# Patient Record
Sex: MALE | Race: WHITE | NOT HISPANIC OR LATINO | Employment: FULL TIME | URBAN - NONMETROPOLITAN AREA
[De-identification: names, ages, dates, MRNs, and addresses within clinical notes are randomized per-mention and may not be internally consistent; named-entity substitution may affect disease eponyms.]

---

## 2017-03-02 ENCOUNTER — CONSULT (OUTPATIENT)
Dept: CARDIOLOGY | Facility: CLINIC | Age: 52
End: 2017-03-02

## 2017-03-02 VITALS
HEART RATE: 70 BPM | HEIGHT: 71 IN | BODY MASS INDEX: 32.9 KG/M2 | WEIGHT: 235 LBS | DIASTOLIC BLOOD PRESSURE: 74 MMHG | SYSTOLIC BLOOD PRESSURE: 118 MMHG

## 2017-03-02 DIAGNOSIS — E88.81 METABOLIC SYNDROME: ICD-10-CM

## 2017-03-02 DIAGNOSIS — R06.02 SHORTNESS OF BREATH: ICD-10-CM

## 2017-03-02 DIAGNOSIS — R07.89 CHEST PRESSURE: Primary | ICD-10-CM

## 2017-03-02 DIAGNOSIS — R01.1 MURMUR, CARDIAC: ICD-10-CM

## 2017-03-02 DIAGNOSIS — R00.2 PALPITATIONS: ICD-10-CM

## 2017-03-02 PROCEDURE — 93000 ELECTROCARDIOGRAM COMPLETE: CPT | Performed by: INTERNAL MEDICINE

## 2017-03-02 PROCEDURE — 99244 OFF/OP CNSLTJ NEW/EST MOD 40: CPT | Performed by: INTERNAL MEDICINE

## 2017-03-02 NOTE — PROGRESS NOTES
CARDIAC COMPLAINTS  chest pressure/discomfort and fatigue      Calli Elizabeth is a 52 y.o. male came in today for his initial cardiac evaluation.  He has history of significant metabolic syndrome for which he underwent bariatric surgery in the form of gastric sleeve and has lost almost 225 pounds.  He used to be on multiple medication for blood pressure cholesterol which were all stopped.  Recently, he started noticing fatigue and found to have some hematuria.  Workup showed kidney stone with infection and hydronephrosis.  He was admitted to the hospital, had nephrostomy tube placed and later had removal of the kidney stones.  During this time, he started noticing chest tightness and sharp chest pain.  It was occurring more and a stress, lasting for a few minutes and then subsides on its own.  Since the symptoms are getting little bit more progressive for a while he is now referred for further evaluation.  Since the kidney stones have been remote, for the last one week the symptoms have come down but still persisting.    Past Surgical History   Procedure Laterality Date   • Cardiovascular stress test  05/04/2006     @[facility]. - 7 Min, 89% THR. EF 51%. negative       Current Outpatient Prescriptions   Medication Sig Dispense Refill   • meloxicam (MOBIC) 15 MG tablet Take 15 mg by mouth Daily.       No current facility-administered medications for this visit.            ALLERGIES:  Review of patient's allergies indicates no known allergies.    Past Medical History   Diagnosis Date   • Arthritis    • H/O bariatric surgery      Gastric sleeve   • Hypertension    • Kidney stone      with abcess       History   Smoking Status   • Never Smoker   Smokeless Tobacco   • Never Used        Review of Systems   Constitutional: Positive for fatigue. Negative for activity change.   HENT: Negative for congestion.    Respiratory: Positive for chest tightness and shortness of breath.    Cardiovascular: Positive  "for chest pain.   Gastrointestinal: Negative for abdominal distention.   Endocrine: Negative for cold intolerance.   Genitourinary: Negative for difficulty urinating.   Musculoskeletal: Positive for arthralgias.   Skin: Negative for color change.   Allergic/Immunologic: Negative for environmental allergies.   Neurological: Negative for dizziness.   Hematological: Negative for adenopathy.   Psychiatric/Behavioral: Negative for agitation.       Diabetes- No  Thyroid- normal    Objective     Visit Vitals   • /74 (BP Location: Left arm)   • Pulse 70   • Ht 71\" (180.3 cm)   • Wt 235 lb (107 kg)   • BMI 32.78 kg/m2       Physical Exam   Constitutional: He appears well-developed.   HENT:   Head: Normocephalic.   Eyes: Pupils are equal, round, and reactive to light.   Neck: Normal range of motion.   Cardiovascular: Normal rate.    Murmur heard.  Pulmonary/Chest: Effort normal.   Abdominal: Soft.   Musculoskeletal: Normal range of motion.   Neurological: He is alert.   Skin: Skin is warm.   Psychiatric: He has a normal mood and affect.         ECG 12 Lead  Date/Time: 3/2/2017 2:11 PM  Performed by: KRISTA HERMAN  Authorized by: KRISTA HERMAN   Previous ECG: no previous ECG available  Rhythm: sinus rhythm  Rate: normal  QRS axis: normal  Clinical impression: non-specific ECG              Assessment/Plan   His heart rate and blood pressure appears to be normal.  His EKG showed normal sinus rhythm with nonspecific ST-T changes.  He had lab work done this morning at your office.  I will really appreciate if you can send me copy of those.  I explained to him, that his symptoms could be related to stress-induced hypertension.  But he cannot rule out underlying ischemia.  He does have multiple risk factors for that.  I scheduled him to undergo a stress test to evaluate his blood pressure response, chronotropic response and also to rule out ischemia causing the chest pain.  He also need an echocardiogram to " evaluate the LV function, valvular structures and rule out pericardial effusion.  Based on the results of these test, further recommendations will be made.   Lefty was seen today for establish care, cardiac history and kidney stone.    Diagnoses and all orders for this visit:    Chest pressure  -     Stress Test With Myocardial Perfusion One Day; Future  -     Stress Test With Myocardial Perfusion One Day    Shortness of breath  -     Adult Transthoracic Echo Complete; Future  -     Adult Transthoracic Echo Complete    Murmur, cardiac  -     Adult Transthoracic Echo Complete; Future  -     Adult Transthoracic Echo Complete    Metabolic syndrome    Palpitations  -     Stress Test With Myocardial Perfusion One Day; Future  -     Stress Test With Myocardial Perfusion One Day                    Electronically signed by lEiz Barbosa MD March 2, 2017 2:05 PM

## 2017-03-07 ENCOUNTER — HOSPITAL ENCOUNTER (OUTPATIENT)
Dept: CARDIOLOGY | Facility: HOSPITAL | Age: 52
Discharge: HOME | End: 2017-03-07
Attending: INTERNAL MEDICINE

## 2017-03-07 ENCOUNTER — OUTSIDE FACILITY SERVICE (OUTPATIENT)
Dept: CARDIOLOGY | Facility: CLINIC | Age: 52
End: 2017-03-07

## 2017-03-07 LAB
MAXIMAL PREDICTED HEART RATE: 166 BPM
STRESS TARGET HR: 141 BPM

## 2017-03-07 PROCEDURE — 93306 TTE W/DOPPLER COMPLETE: CPT

## 2017-03-07 PROCEDURE — 93306 TTE W/DOPPLER COMPLETE: CPT | Performed by: INTERNAL MEDICINE

## 2017-03-07 PROCEDURE — 93018 CV STRESS TEST I&R ONLY: CPT | Performed by: INTERNAL MEDICINE

## 2017-03-07 PROCEDURE — 78452 HT MUSCLE IMAGE SPECT MULT: CPT

## 2017-03-07 PROCEDURE — 78452 HT MUSCLE IMAGE SPECT MULT: CPT | Performed by: INTERNAL MEDICINE

## 2017-03-07 PROCEDURE — 0 TECHNETIUM SESTAMIBI: Performed by: INTERNAL MEDICINE

## 2017-03-07 PROCEDURE — 93017 CV STRESS TEST TRACING ONLY: CPT

## 2017-03-07 PROCEDURE — A9500 TC99M SESTAMIBI: HCPCS | Performed by: INTERNAL MEDICINE

## 2017-03-07 RX ADMIN — Medication 1 DOSE: at 11:00

## 2017-03-09 ENCOUNTER — TELEPHONE (OUTPATIENT)
Dept: CARDIOLOGY | Facility: CLINIC | Age: 52
End: 2017-03-09

## 2017-03-24 ENCOUNTER — HOSPITAL ENCOUNTER (EMERGENCY)
Facility: HOSPITAL | Age: 52
Discharge: LEFT BEFORE DISCHARGE | End: 2017-03-24

## 2017-03-24 VITALS
DIASTOLIC BLOOD PRESSURE: 65 MMHG | BODY MASS INDEX: 32.34 KG/M2 | OXYGEN SATURATION: 98 % | WEIGHT: 231 LBS | SYSTOLIC BLOOD PRESSURE: 117 MMHG | HEART RATE: 65 BPM | HEIGHT: 71 IN | TEMPERATURE: 98 F | RESPIRATION RATE: 18 BRPM

## 2017-03-24 LAB
BACTERIA UR QL AUTO: ABNORMAL /HPF
BILIRUB UR QL STRIP: NEGATIVE
CLARITY UR: ABNORMAL
COLOR UR: YELLOW
GLUCOSE UR STRIP-MCNC: NEGATIVE MG/DL
HGB UR QL STRIP.AUTO: ABNORMAL
HYALINE CASTS UR QL AUTO: ABNORMAL /LPF
KETONES UR QL STRIP: ABNORMAL
LEUKOCYTE ESTERASE UR QL STRIP.AUTO: ABNORMAL
NITRITE UR QL STRIP: NEGATIVE
PH UR STRIP.AUTO: 5.5 [PH] (ref 5–8)
PROT UR QL STRIP: ABNORMAL
RBC # UR: ABNORMAL /HPF
REF LAB TEST METHOD: ABNORMAL
SP GR UR STRIP: >=1.03 (ref 1–1.03)
SQUAMOUS #/AREA URNS HPF: ABNORMAL /HPF
UROBILINOGEN UR QL STRIP: ABNORMAL
WBC UR QL AUTO: ABNORMAL /HPF

## 2017-03-24 PROCEDURE — 81001 URINALYSIS AUTO W/SCOPE: CPT

## 2017-03-24 PROCEDURE — 87086 URINE CULTURE/COLONY COUNT: CPT

## 2017-03-24 PROCEDURE — 87186 SC STD MICRODIL/AGAR DIL: CPT

## 2017-03-24 PROCEDURE — 87077 CULTURE AEROBIC IDENTIFY: CPT

## 2017-03-24 PROCEDURE — 99211 OFF/OP EST MAY X REQ PHY/QHP: CPT

## 2017-03-24 RX ORDER — SODIUM CHLORIDE 0.9 % (FLUSH) 0.9 %
10 SYRINGE (ML) INJECTION AS NEEDED
Status: DISCONTINUED | OUTPATIENT
Start: 2017-03-24 | End: 2017-03-24 | Stop reason: HOSPADM

## 2017-03-27 LAB — BACTERIA SPEC AEROBE CULT: ABNORMAL

## 2017-03-28 ENCOUNTER — TELEPHONE (OUTPATIENT)
Dept: EMERGENCY DEPT | Facility: HOSPITAL | Age: 52
End: 2017-03-28

## 2017-09-05 ENCOUNTER — OFFICE VISIT (OUTPATIENT)
Dept: BARIATRICS/WEIGHT MGMT | Facility: CLINIC | Age: 52
End: 2017-09-05

## 2017-09-05 VITALS
DIASTOLIC BLOOD PRESSURE: 88 MMHG | SYSTOLIC BLOOD PRESSURE: 135 MMHG | HEIGHT: 71 IN | TEMPERATURE: 97.9 F | BODY MASS INDEX: 33.6 KG/M2 | WEIGHT: 240 LBS | OXYGEN SATURATION: 99 % | HEART RATE: 74 BPM | RESPIRATION RATE: 18 BRPM

## 2017-09-05 DIAGNOSIS — Z98.84 STATUS POST BARIATRIC SURGERY: ICD-10-CM

## 2017-09-05 DIAGNOSIS — Z13.21 MALNUTRITION SCREEN: ICD-10-CM

## 2017-09-05 DIAGNOSIS — Z90.3 POSTGASTRECTOMY MALABSORPTION: Primary | ICD-10-CM

## 2017-09-05 DIAGNOSIS — E66.9 OBESITY, CLASS II, BMI 35-39.9: ICD-10-CM

## 2017-09-05 DIAGNOSIS — Z13.0 SCREENING, ANEMIA, DEFICIENCY, IRON: ICD-10-CM

## 2017-09-05 DIAGNOSIS — K91.2 POSTGASTRECTOMY MALABSORPTION: Primary | ICD-10-CM

## 2017-09-05 DIAGNOSIS — E55.9 HYPOVITAMINOSIS D: ICD-10-CM

## 2017-09-05 PROCEDURE — 99214 OFFICE O/P EST MOD 30 MIN: CPT | Performed by: SURGERY

## 2017-09-05 NOTE — PROGRESS NOTES
"[facility]ariat[facility]  [address]  [facility] [address]  [phone]        Patient Name:  Lefty Elizabeth.  :  1965      Date of Visit: 2017      Reason for Visit:   4 years postop      HPI: Lefty Elizabeth is a 52 y.o. male s/p LSG/HHR 3/31/14 by Dr. Gaytan.      Doing well.  Had a \"rough year.\"  He had kidney stones and pyelonephritis, and actually required lap-assisted left nephrectomy.  He had a PICC line for abx to treat Proteus mirabilis psoas abscess and also was drained.  All this work was done through [facility]. Has been told he can't eat as much protein as before.  Was told to cut back.  They did not specify how much exactly.  He was told to ask us.  Also concerned re: 6 pound weight gain.      Denies dysphagia, reflux, nausea, vomiting and abdominal pain.  Getting 50 or less g prot/day.  Drinking 64+ fluid oz/day.  Labs in the past revealed low iron. Was taking vitamins, but all meds discontinued about 2 months ago. Exercising: had to stop exercising while he was so ill.  Had planned skin removal on abdomen/thighs, but was delayed by nephrolithiasis and consequences.     Presurgery weight: 450 pounds.  Today's weight is 240 lb (109 kg) pounds, today's  Body mass index is 33.47 kg/(m^2)., and his weight loss since surgery is 210 pounds.      Past Medical History:   Diagnosis Date   • Arthritis    • Chronic back pain    • Dyspnea on exertion    • Fatigue    • GERD (gastroesophageal reflux disease)    • Glucose intolerance (impaired glucose tolerance)    • H/O bariatric surgery     Gastric sleeve   • Heartburn    • Hypercholesterolemia    • Hypertension    • Hypertriglyceridemia    • Insomnia    • Joint pain    • Kidney stone     with abcess   • Osteoarthritis of neck     /back   • Pain in hip    • Pain in neck     /back   • Pain, joint, ankle and foot    • Pain, knee    • Peripheral edema    • Sleep apnea    • Urinary incontinence     stress " "    Past Surgical History:   Procedure Laterality Date   • CARDIOVASCULAR STRESS TEST  05/04/2006    @[facility]. - 7 Min, 89% THR. EF 51%. negative     No outpatient prescriptions have been marked as taking for the 9/5/17 encounter (Office Visit) with Ginna Quintana MD.       No Known Allergies    Social History     Social History   • Marital status:      Spouse name: N/A   • Number of children: N/A   • Years of education: N/A     Occupational History   • Not on file.     Social History Main Topics   • Smoking status: Never Smoker   • Smokeless tobacco: Never Used   • Alcohol use No   • Drug use: No   • Sexual activity: Not on file     Other Topics Concern   • Not on file     Social History Narrative       /88 (BP Location: Left arm, Patient Position: Sitting, Cuff Size: Large Adult)  Pulse 74  Temp 97.9 °F (36.6 °C) (Temporal Artery )   Resp 18  Ht 71\" (180.3 cm)  Wt 240 lb (109 kg)  SpO2 99%  BMI 33.47 kg/m2    Physical Exam   Constitutional: He is oriented to person, place, and time. He appears well-developed and well-nourished. No distress.   HENT:   Head: Normocephalic and atraumatic.   Mouth/Throat: No oropharyngeal exudate.   Eyes: EOM are normal. Pupils are equal, round, and reactive to light.   Abdominal: Soft. He exhibits no distension and no mass. There is no tenderness. No hernia.   Recent nephrectomy scars and old lap scars well healed.  Small infraumbilical vertical midline scar well-healed   Musculoskeletal: Normal range of motion. He exhibits no edema or deformity.   Lots of loose skin on his legs   Neurological: He is alert and oriented to person, place, and time. No cranial nerve deficit.   Skin: Skin is warm and dry. He is not diaphoretic. No erythema.   Psychiatric: He has a normal mood and affect. His behavior is normal. Judgment and thought content normal.         Assessment:  4 years s/p LSG/HHR by Dr. Gaytan    ICD-10-CM ICD-9-CM   1. Postgastrectomy malabsorption " K91.2 579.3    Z90.3    2. Screening, anemia, deficiency, iron Z13.0 V78.0   3. Hypovitaminosis D E55.9 268.9   4. Status post bariatric surgery Z98.84 V45.86   5. Malnutrition screen Z13.21 V77.2   6. Obesity, Class II, BMI 35-39.9 E66.01 278.01         Plan:  Doing well. Continue w/ good food choices and healthy habits.  With respect to solitary kidney, try to get at least 75 g/day of protein.  Kidney is reportedly functioning fine, and I fear to shift balance toward protein malnutrition.  Continue routine exercise.  Routine bariatric labs ordered.  Continue vitamins w/ adjustments pending lab results.  Call w/ problems/concerns.     The patient was instructed to follow up in 1 year, sooner if needed.    note: approx 15 of the 25 minute visit was spent counseling on nutrition and necessary dietary/lifestyle modifications.    Ginna Quintana MD

## 2017-09-08 LAB
25(OH)D3+25(OH)D2 SERPL-MCNC: 32.3 NG/ML (ref 30–100)
A-TOCOPHEROL VIT E SERPL-MCNC: 11.5 MG/L (ref 5.3–17.5)
ALBUMIN SERPL-MCNC: 4.4 G/DL (ref 3.5–5.5)
ALBUMIN/GLOB SERPL: 1.8 {RATIO} (ref 1.2–2.2)
ALP SERPL-CCNC: 98 IU/L (ref 39–117)
ALT SERPL-CCNC: 9 IU/L (ref 0–44)
AST SERPL-CCNC: 16 IU/L (ref 0–40)
BASOPHILS # BLD AUTO: 0 X10E3/UL (ref 0–0.2)
BASOPHILS NFR BLD AUTO: 1 %
BILIRUB SERPL-MCNC: 0.4 MG/DL (ref 0–1.2)
BUN SERPL-MCNC: 17 MG/DL (ref 6–24)
BUN/CREAT SERPL: 13 (ref 9–20)
CALCIUM SERPL-MCNC: 9.3 MG/DL (ref 8.7–10.2)
CHLORIDE SERPL-SCNC: 103 MMOL/L (ref 96–106)
CO2 SERPL-SCNC: 25 MMOL/L (ref 18–29)
CREAT SERPL-MCNC: 1.26 MG/DL (ref 0.76–1.27)
EOSINOPHIL # BLD AUTO: 0.3 X10E3/UL (ref 0–0.4)
EOSINOPHIL NFR BLD AUTO: 7 %
ERYTHROCYTE [DISTWIDTH] IN BLOOD BY AUTOMATED COUNT: 17 % (ref 12.3–15.4)
FERRITIN SERPL-MCNC: 61 NG/ML (ref 30–400)
FOLATE SERPL-MCNC: 10 NG/ML
GLOBULIN SER CALC-MCNC: 2.4 G/DL (ref 1.5–4.5)
GLUCOSE SERPL-MCNC: 118 MG/DL (ref 65–99)
HCT VFR BLD AUTO: 38 % (ref 37.5–51)
HGB BLD-MCNC: 12.9 G/DL (ref 12.6–17.7)
IMM GRANULOCYTES # BLD: 0 X10E3/UL (ref 0–0.1)
IMM GRANULOCYTES NFR BLD: 0 %
IRON SERPL-MCNC: 37 UG/DL (ref 38–169)
LYMPHOCYTES # BLD AUTO: 1.2 X10E3/UL (ref 0.7–3.1)
LYMPHOCYTES NFR BLD AUTO: 32 %
MAGNESIUM SERPL-MCNC: 1.9 MG/DL (ref 1.6–2.3)
MCH RBC QN AUTO: 28.6 PG (ref 26.6–33)
MCHC RBC AUTO-ENTMCNC: 33.9 G/DL (ref 31.5–35.7)
MCV RBC AUTO: 84 FL (ref 79–97)
METHYLMALONATE SERPL-SCNC: 147 NMOL/L (ref 0–378)
MONOCYTES # BLD AUTO: 0.4 X10E3/UL (ref 0.1–0.9)
MONOCYTES NFR BLD AUTO: 10 %
NEUTROPHILS # BLD AUTO: 1.9 X10E3/UL (ref 1.4–7)
NEUTROPHILS NFR BLD AUTO: 50 %
PHOSPHATE SERPL-MCNC: 3.9 MG/DL (ref 2.5–4.5)
PLATELET # BLD AUTO: 222 X10E3/UL (ref 150–379)
POTASSIUM SERPL-SCNC: 5 MMOL/L (ref 3.5–5.2)
PREALB SERPL-MCNC: 38 MG/DL (ref 10–36)
PROT SERPL-MCNC: 6.8 G/DL (ref 6–8.5)
PTH-INTACT SERPL-MCNC: 44 PG/ML (ref 15–65)
RBC # BLD AUTO: 4.51 X10E6/UL (ref 4.14–5.8)
SODIUM SERPL-SCNC: 145 MMOL/L (ref 134–144)
VIT A SERPL-MCNC: 91 UG/DL (ref 24–85)
VIT B1 BLD-SCNC: 141.5 NMOL/L (ref 66.5–200)
WBC # BLD AUTO: 3.8 X10E3/UL (ref 3.4–10.8)
ZINC SERPL-MCNC: 68 UG/DL (ref 56–134)

## 2017-09-14 ENCOUNTER — OFFICE VISIT (OUTPATIENT)
Dept: CARDIOLOGY | Facility: CLINIC | Age: 52
End: 2017-09-14

## 2017-09-14 VITALS
WEIGHT: 246 LBS | HEIGHT: 71 IN | HEART RATE: 72 BPM | SYSTOLIC BLOOD PRESSURE: 120 MMHG | BODY MASS INDEX: 34.44 KG/M2 | DIASTOLIC BLOOD PRESSURE: 86 MMHG

## 2017-09-14 DIAGNOSIS — E78.49 OTHER HYPERLIPIDEMIA: ICD-10-CM

## 2017-09-14 DIAGNOSIS — E88.81 METABOLIC SYNDROME: ICD-10-CM

## 2017-09-14 DIAGNOSIS — I10 ESSENTIAL HYPERTENSION: Primary | ICD-10-CM

## 2017-09-14 DIAGNOSIS — I34.0 NON-RHEUMATIC MITRAL REGURGITATION: ICD-10-CM

## 2017-09-14 DIAGNOSIS — K21.9 GASTROESOPHAGEAL REFLUX DISEASE WITHOUT ESOPHAGITIS: ICD-10-CM

## 2017-09-14 DIAGNOSIS — Z90.5 S/P NEPHRECTOMY: ICD-10-CM

## 2017-09-14 PROBLEM — E88.810 METABOLIC SYNDROME: Status: ACTIVE | Noted: 2017-09-14

## 2017-09-14 PROBLEM — E78.5 HYPERLIPEMIA: Status: ACTIVE | Noted: 2017-09-14

## 2017-09-14 PROCEDURE — 99213 OFFICE O/P EST LOW 20 MIN: CPT | Performed by: NURSE PRACTITIONER

## 2017-09-14 RX ORDER — MULTIPLE VITAMINS W/ MINERALS TAB 9MG-400MCG
1 TAB ORAL DAILY
COMMUNITY
End: 2020-07-16 | Stop reason: ALTCHOICE

## 2017-09-14 RX ORDER — TAMSULOSIN HYDROCHLORIDE 0.4 MG/1
1 CAPSULE ORAL NIGHTLY
COMMUNITY
End: 2020-07-16 | Stop reason: ALTCHOICE

## 2017-09-14 NOTE — PROGRESS NOTES
Chief Complaint   Patient presents with   • Follow-up     cardiac management, labs per Dr. Gaytan results under lab in patient's chart.        Cardiac Complaints  none      Subjective   Lefty Elizabeth is a 52 y.o. male with significant metabolic syndrome for which he underwent bariatric surgery in the form of gastric sleeve and has lost about 225 pounds.  He was on multiple medications for blood pressure anjd cholesterol management which were all stopped after his weight loss.  In February, he started noticing fatigue and hematuria.  Workup showed kidney stone with infection and hydronephrosis.  He was admitted to the hospital, had nephrostomy tube placed and later had removal of the kidney stones.  During admission, he thenstarted noticing chest tightness and sharp chest pain and was referred for the same.  Cardiac workup was advised at consult to rule out any ischemia, pericardial effusion, and to assess cardiac structures.  Stress showed good LV function without ischemic burden.  He returns today for follow up and states that he went to  in the summer for high fever, night sweats, and abnormal labs.  He was diagnosed with a recurrent kidney infection as his kidney stone became lodged and he could not rid himself of infection.  He then had a PICC line placed and received IV antibiotics for 8 weeks.  Kidney infection of left kidney did not resolve and he had a left nephrectomy removed on August 1st.  He then received another 4 weeks of IV antibiotics after removal.  He continues to follow with Dr. Jennings and Dr. Sanchez at  in regards.  He comes today for follow up and denies any new concerns.  He says from a cardiac standpoint he feel like he has been doing well.  Labs he reports recently with Dr. Gaytan, labs to chart shows creatinine at 1.26, which he says urology has explained baseline may be about 1.3, GFR over 60.  His sodium is 145 and glucose slightly elevated at 118, and RBC of 4.5 and hemoglobin of  12.9.  No refills are needed.  He states flomax with urology and is on no other prescription medication since weight loss surgery.        Cardiac History  Past Surgical History:   Procedure Laterality Date   • CARDIOVASCULAR STRESS TEST  05/04/2006    @[facility]. - 7 Min, 89% THR. EF 51%. negative   • CARDIOVASCULAR STRESS TEST  03/17/2017    EF 57%, 4 min 16 sec, no ischemia   • ECHO - CONVERTED  03/17/2017    EF 60-65%, no AS, mild MR       Current Outpatient Prescriptions   Medication Sig Dispense Refill   • Multiple Vitamins-Minerals (MULTIVITAMIN WITH MINERALS) tablet tablet Take 1 tablet by mouth Daily.     • tamsulosin (FLOMAX) 0.4 MG capsule 24 hr capsule Take 1 capsule by mouth Every Night.       No current facility-administered medications for this visit.        Review of patient's allergies indicates no known allergies.    Past Medical History:   Diagnosis Date   • Arthritis    • Chronic back pain    • Dyspnea on exertion    • Fatigue    • GERD (gastroesophageal reflux disease)    • Glucose intolerance (impaired glucose tolerance)    • H/O bariatric surgery     Gastric sleeve   • Heartburn    • Hypercholesterolemia    • Hypertension    • Hypertriglyceridemia    • Insomnia    • Joint pain    • Kidney stone     with abcess   • Osteoarthritis of neck     /back   • Pain in hip    • Pain in neck     /back   • Pain, joint, ankle and foot    • Pain, knee    • Peripheral edema    • Sleep apnea    • Urinary incontinence     stress       Social History     Social History   • Marital status:      Spouse name: N/A   • Number of children: N/A   • Years of education: N/A     Occupational History   • Not on file.     Social History Main Topics   • Smoking status: Never Smoker   • Smokeless tobacco: Never Used   • Alcohol use No   • Drug use: No   • Sexual activity: Not on file     Other Topics Concern   • Not on file     Social History Narrative       Family History   Problem Relation Age of Onset   • Lupus  "Mother    • Hypertension Mother    • Cancer Mother    • Stroke Mother    • Heart failure Father    • Heart disease Father      valve problems due to rheumatic fever   • Hypertension Father    • Obesity Father    • Cancer Brother        Review of Systems   Constitution: Negative for weakness and malaise/fatigue.   HENT: Positive for sore throat. Negative for congestion.    Cardiovascular: Negative for chest pain, dyspnea on exertion, irregular heartbeat, near-syncope, orthopnea, palpitations and syncope.   Respiratory: Negative for cough and wheezing.    Musculoskeletal: Negative for arthritis and back pain.   Gastrointestinal: Negative for anorexia, heartburn and nausea.   Genitourinary: Positive for dysuria and hesitancy. Negative for nocturia.   Neurological: Negative for dizziness, focal weakness and light-headedness.   Psychiatric/Behavioral: Negative for altered mental status and depression.       DiabetesNo  Thyroidnormal    Objective     /86 (BP Location: Left arm)  Pulse 72  Ht 71\" (180.3 cm)  Wt 246 lb (112 kg)  BMI 34.31 kg/m2    Physical Exam   Constitutional: He is oriented to person, place, and time. He appears well-developed and well-nourished.   HENT:   Head: Normocephalic and atraumatic.   Eyes: EOM are normal. Pupils are equal, round, and reactive to light.   Neck: Normal range of motion. Neck supple.   Cardiovascular: Normal rate and regular rhythm.    Pulmonary/Chest: Effort normal and breath sounds normal.   Abdominal: Soft.   Musculoskeletal: Normal range of motion.   Neurological: He is alert and oriented to person, place, and time.   Skin: Skin is warm and dry.   Psychiatric: He has a normal mood and affect. His behavior is normal.       Procedures    Assessment/Plan     HR and BP are both stable today.  His weight is up about 11 pounds since last visit but he has been very inactive for several months due to illness and kidney removal.  He will continue to follow with [facility] doctors in " regards.  Most recent cardiac workup with stress and echo findings discussed with patient, with no ischemic burden and good LV function.  Labs recently done with Dr. Gaytan to the chart reviewed with patient.  He is not on any prescription medication for his HTN and hyperlipidemia as his weight loss has improved his labs.  Good cardiac diet and activity as tolerated advised.  Since his cardiac workup showed no ischemic burden and no significant structural concerns and patient denies cardiac concerns, we will extend visits to yearly.  He was advised to call for any problems and further recommendations will be made.        Problems Addressed this Visit        Cardiovascular and Mediastinum    HTN (hypertension) - Primary    Hyperlipemia       Digestive    Gastroesophageal reflux disease without esophagitis       Other    Metabolic syndrome    S/p nephrectomy      Other Visit Diagnoses     Non-rheumatic mitral regurgitation                  Electronically signed by KIMO Trinidad September 14, 2017 10:49 AM

## 2020-07-16 ENCOUNTER — OFFICE VISIT (OUTPATIENT)
Dept: CARDIOLOGY | Facility: CLINIC | Age: 55
End: 2020-07-16

## 2020-07-16 VITALS
SYSTOLIC BLOOD PRESSURE: 160 MMHG | HEIGHT: 71 IN | DIASTOLIC BLOOD PRESSURE: 98 MMHG | WEIGHT: 269 LBS | BODY MASS INDEX: 37.66 KG/M2 | HEART RATE: 53 BPM

## 2020-07-16 DIAGNOSIS — I10 ESSENTIAL HYPERTENSION: ICD-10-CM

## 2020-07-16 DIAGNOSIS — E66.01 SEVERE OBESITY (BMI 35.0-39.9) WITH COMORBIDITY (HCC): ICD-10-CM

## 2020-07-16 DIAGNOSIS — E78.49 OTHER HYPERLIPIDEMIA: ICD-10-CM

## 2020-07-16 DIAGNOSIS — R00.1 BRADYCARDIA, SINUS: Primary | ICD-10-CM

## 2020-07-16 PROCEDURE — 99213 OFFICE O/P EST LOW 20 MIN: CPT | Performed by: NURSE PRACTITIONER

## 2020-07-16 PROCEDURE — 93000 ELECTROCARDIOGRAM COMPLETE: CPT | Performed by: NURSE PRACTITIONER

## 2020-07-16 RX ORDER — LISINOPRIL AND HYDROCHLOROTHIAZIDE 20; 12.5 MG/1; MG/1
1 TABLET ORAL DAILY
Qty: 90 TABLET | Refills: 2 | Status: SHIPPED | OUTPATIENT
Start: 2020-07-16 | End: 2021-08-09

## 2020-07-16 RX ORDER — LISINOPRIL 10 MG/1
10 TABLET ORAL DAILY
COMMUNITY
End: 2020-07-16 | Stop reason: DRUGHIGH

## 2020-07-16 NOTE — PROGRESS NOTES
Chief Complaint   Patient presents with   • Follow-up     For cardiac management. Patient is not on aspirin. Will be having lab work with new PCP next week, has not had labs since 2017. States that he does have some chest pain in the upper left chest. States that Friday before the 4th of July his BP was elevated and went to urgent care and they added back lisinopril. States that he has been taking BP in both arms and BP are different, will take BP in both arms.    • Med Refill     PCP does medication refills. Went over medications verbally.        Cardiac Complaints  none      Subjective   Lefty Elizabeth Jr. is a 55 y.o. male with HTN, bradycardia, and significant metabolic syndrome for which he underwent bariatric surgery in the form of gastric sleeve and has lost about 225 pounds.  He was on multiple medications for blood pressure anjd cholesterol management which were all stopped after his weight loss.  In February, he started noticing fatigue and hematuria.  Workup showed kidney stone with infection and hydronephrosis.  He was admitted to the hospital, had nephrostomy tube placed and later had removal of the kidney stones.  During admission, he thenstarted noticing chest tightness and sharp chest pain and was referred for the same.  Cardiac workup was advised at consult to rule out any ischemia, pericardial effusion, and to assess cardiac structures.  Stress showed good LV function without ischemic burden.  He returns today for follow up and states that he went to  in the summer for high fever, night sweats, and abnormal labs.  He was diagnosed with a recurrent kidney infection as his kidney stone became lodged and he could not rid himself of infection.  He then had a PICC line placed and received IV antibiotics for 8 weeks.  Kidney infection of left kidney did not resolve and he had a left nephrectomy removed on August 1st.  He then received another 4 weeks of IV antibiotics after removal.  He continues  to follow with Dr. Jennings and Dr. Sanchez at  in regards.    Patient returns today for follow up and does report some pain in the left upper chest area. He states he has had soreness in the area for around one year after an injury in the past.  He does report issues with HTN and states he had to go to [facility] before the 4th of July weekend, lisinopril therapy was added back. He does state blood pressures have been different in both arms and often off about 10-15 SBP in each arm. Labs he admits remain followed by PCP and will be rechecked next week. He has not had done for about 3 years. No refills needed.             Cardiac History  Past Surgical History:   Procedure Laterality Date   • CARDIOVASCULAR STRESS TEST  05/04/2006    @[facility]. - 7 Min, 89% THR. EF 51%. negative   • CARDIOVASCULAR STRESS TEST  03/17/2017    EF 57%, 4 min 16 sec, no ischemia   • ECHO - CONVERTED  03/17/2017    EF 60-65%, no AS, mild MR       Current Outpatient Medications   Medication Sig Dispense Refill   • lisinopril-hydrochlorothiazide (PRINZIDE,ZESTORETIC) 20-12.5 MG per tablet Take 1 tablet by mouth Daily. 90 tablet 2     No current facility-administered medications for this visit.        Patient has no known allergies.    Past Medical History:   Diagnosis Date   • Arthritis    • Chronic back pain    • Dyspnea on exertion    • Fatigue    • GERD (gastroesophageal reflux disease)    • Glucose intolerance (impaired glucose tolerance)    • H/O bariatric surgery     Gastric sleeve   • Heartburn    • Hypercholesterolemia    • Hypertension    • Hypertriglyceridemia    • Insomnia    • Joint pain    • Kidney stone     with abcess   • Osteoarthritis of neck     /back   • Pain in hip    • Pain in neck     /back   • Pain, joint, ankle and foot    • Pain, knee    • Peripheral edema    • Sleep apnea    • Urinary incontinence     stress       Social History     Socioeconomic History   • Marital status:      Spouse name: Not on file   •  "Number of children: Not on file   • Years of education: Not on file   • Highest education level: Not on file   Tobacco Use   • Smoking status: Never Smoker   • Smokeless tobacco: Never Used   Substance and Sexual Activity   • Alcohol use: No   • Drug use: No       Family History   Problem Relation Age of Onset   • Lupus Mother    • Hypertension Mother    • Cancer Mother    • Stroke Mother    • Heart failure Father    • Heart disease Father         valve problems due to rheumatic fever   • Hypertension Father    • Obesity Father    • Cancer Brother        Review of Systems   Constitution: Positive for malaise/fatigue. Negative for night sweats.   Cardiovascular: Negative for chest pain, claudication, dyspnea on exertion, irregular heartbeat, leg swelling, near-syncope, orthopnea, palpitations and syncope.   Respiratory: Positive for shortness of breath. Negative for cough and wheezing.    Musculoskeletal: Positive for stiffness. Negative for back pain, joint pain and joint swelling.   Gastrointestinal: Negative for anorexia, heartburn, melena, nausea and vomiting.   Genitourinary: Negative for dysuria, hematuria, hesitancy and nocturia.   Neurological: Negative for dizziness, light-headedness and loss of balance.   Psychiatric/Behavioral: Negative for depression and memory loss. The patient is not nervous/anxious.            Objective     /98 (BP Location: Right arm)   Pulse 53   Ht 180.3 cm (70.98\")   Wt 122 kg (269 lb)   BMI 37.53 kg/m²     Physical Exam   Constitutional: He is oriented to person, place, and time. He appears well-developed and well-nourished.   HENT:   Head: Normocephalic and atraumatic.   Eyes: Pupils are equal, round, and reactive to light. EOM are normal.   Neck: Normal range of motion. Neck supple.   Cardiovascular: Regular rhythm. Bradycardia present.   Murmur heard.  Pulmonary/Chest: Effort normal and breath sounds normal.   Abdominal: Soft.   Musculoskeletal: Normal range of " motion.   Neurological: He is alert and oriented to person, place, and time.   Skin: Skin is warm and dry.   Psychiatric: He has a normal mood and affect. His behavior is normal.         ECG 12 Lead  Date/Time: 7/16/2020 10:12 AM  Performed by: Mae Napier APRN  Authorized by: Mae Napier APRN   Comparison: compared with previous ECG from 3/2/2017  Rhythm: sinus bradycardia  BPM: 53    Clinical impression: abnormal EKG  Comments: Normal QT            Assessment/Plan     HTN:  Blood pressure not at goal. Elevated today at 160/98.  Patient will be urged to d/c the current lisinopril dosing of 10mg daily and begin taking zestoretic at 20/12.5mg daily.  Patient to return next Wednesday to have repeat blood pressure check in each arm. He was strongly urged to limit his sodium intake as well.     Chest soreness:  Discussed the type of pain, he attributes to muscle pain as he said it can be reproduced with certain movements.      Sinus bradycardia:  Not on any heart rate lowering medication, remains bradycardic but has been similar to prior. Symptomatic bradycardia denied.     BMI noted at 37.53, good cardiac diet with limited caloric intake, carbs, and activity as tolerated advised.    6 month follow up recommended or sooner if needed.        Problems Addressed this Visit        Cardiovascular and Mediastinum    HTN (hypertension)    Relevant Medications    lisinopril-hydrochlorothiazide (PRINZIDE,ZESTORETIC) 20-12.5 MG per tablet    Hyperlipemia      Other Visit Diagnoses     Bradycardia, sinus    -  Primary    Relevant Orders    ECG 12 Lead    Severe obesity (BMI 35.0-39.9) with comorbidity (CMS/HCC)              Patient's Body mass index is 37.53 kg/m². BMI is above normal parameters. Recommendations include: nutrition counseling.            Electronically signed by KIMO Trinidad July 17, 2020 14:38

## 2020-07-22 ENCOUNTER — CLINICAL SUPPORT (OUTPATIENT)
Dept: CARDIOLOGY | Facility: CLINIC | Age: 55
End: 2020-07-22

## 2020-07-22 ENCOUNTER — TELEPHONE (OUTPATIENT)
Dept: CARDIOLOGY | Facility: CLINIC | Age: 55
End: 2020-07-22

## 2020-07-22 NOTE — TELEPHONE ENCOUNTER
Patient was made aware to split lisinopril-hctz and see if that keeps BP from dropping at times. Patient verbalized understanding. Patient was made aware to let us know if it does not help.

## 2020-07-22 NOTE — TELEPHONE ENCOUNTER
Patient came by for BP check after starting lisinopril-hctz 20-12.5 mg daily.     BP in left arm - 130/80  BP in right arm -  134/80  HR 72    Patient states that he takes BP reading at night and it has been running around 98/53. Patient states that he has been more tired recently. Patient is asking when would be the best time of day to take medication?

## 2020-07-22 NOTE — TELEPHONE ENCOUNTER
Tell him to try and split it up to BID dosing and see how it does.  That may help with that drop in the PM.

## 2020-07-27 ENCOUNTER — TELEPHONE (OUTPATIENT)
Dept: CARDIOLOGY | Facility: CLINIC | Age: 55
End: 2020-07-27

## 2020-07-27 RX ORDER — BUPROPION HYDROCHLORIDE 75 MG/1
75 TABLET ORAL 2 TIMES DAILY
Qty: 180 TABLET | Refills: 3 | Status: SHIPPED | OUTPATIENT
Start: 2020-07-27 | End: 2021-11-24

## 2020-07-27 NOTE — TELEPHONE ENCOUNTER
Per verbal order from Mae Napier script for wellbutrin 75 mg BID sent to [facility] in [place].

## 2021-04-15 ENCOUNTER — TELEPHONE (OUTPATIENT)
Dept: CARDIOLOGY | Facility: CLINIC | Age: 56
End: 2021-04-15

## 2021-04-15 DIAGNOSIS — I10 ESSENTIAL HYPERTENSION: Primary | ICD-10-CM

## 2021-04-15 DIAGNOSIS — R93.89 ABNORMAL CT SCAN: ICD-10-CM

## 2021-04-15 NOTE — TELEPHONE ENCOUNTER
"PCP recently ordered CT, report is in chart. Concerned due to \" marked calcifications in the wall of the aorta\". He has an appointment 5/10/21 to see you. Any suggestions prior?   "

## 2021-04-22 ENCOUNTER — TELEPHONE (OUTPATIENT)
Dept: CARDIOLOGY | Facility: CLINIC | Age: 56
End: 2021-04-22

## 2021-04-22 NOTE — TELEPHONE ENCOUNTER
Please let patient know that he does not have any AAA, which is a consequence of plaque build up in the aorta.  This looks okay.

## 2021-04-23 NOTE — TELEPHONE ENCOUNTER
Thank you.

## 2021-05-10 ENCOUNTER — OFFICE VISIT (OUTPATIENT)
Dept: CARDIOLOGY | Facility: CLINIC | Age: 56
End: 2021-05-10

## 2021-05-10 VITALS
HEART RATE: 64 BPM | HEIGHT: 71 IN | TEMPERATURE: 98.2 F | DIASTOLIC BLOOD PRESSURE: 78 MMHG | WEIGHT: 269 LBS | RESPIRATION RATE: 16 BRPM | BODY MASS INDEX: 37.66 KG/M2 | SYSTOLIC BLOOD PRESSURE: 132 MMHG

## 2021-05-10 DIAGNOSIS — I10 ESSENTIAL HYPERTENSION: Primary | ICD-10-CM

## 2021-05-10 DIAGNOSIS — E66.01 SEVERE OBESITY (BMI 35.0-39.9) WITH COMORBIDITY (HCC): ICD-10-CM

## 2021-05-10 DIAGNOSIS — E78.49 OTHER HYPERLIPIDEMIA: ICD-10-CM

## 2021-05-10 PROCEDURE — 99213 OFFICE O/P EST LOW 20 MIN: CPT | Performed by: NURSE PRACTITIONER

## 2021-05-10 RX ORDER — DULOXETIN HYDROCHLORIDE 20 MG/1
20 CAPSULE, DELAYED RELEASE ORAL DAILY
COMMUNITY
End: 2022-12-21 | Stop reason: ALTCHOICE

## 2021-05-10 NOTE — PROGRESS NOTES
Chief Complaint   Patient presents with   • Follow-up     Cardiac Management, no asa therapy, last labs done in March, results in chart, managed by PCP,  reports doing well   • Med Refill     managed by PCP   • Hypertension     monitors at home,  reports BP has been good, no complaints       Cardiac Complaints  none      Subjective   Lefty Elizabeth Jr. is a 56 y.o. male with HTN, bradycardia, and significant metabolic syndrome for which he underwent bariatric surgery in the form of gastric sleeve and has lost about 225 pounds.  He was on multiple medications for blood pressure anjd cholesterol management which were all stopped after his weight loss.  In February, he started noticing fatigue and hematuria.  Workup showed kidney stone with infection and hydronephrosis.  He was admitted to the hospital, had nephrostomy tube placed and later had removal of the kidney stones.  During admission, he thenstarted noticing chest tightness and sharp chest pain and was referred for the same.  Cardiac workup was advised at consult to rule out any ischemia, pericardial effusion, and to assess cardiac structures.  Stress showed good LV function without ischemic burden.  He returns today for follow up and states that he went to  in the summer for high fever, night sweats, and abnormal labs.  He was diagnosed with a recurrent kidney infection as his kidney stone became lodged and he could not rid himself of infection. He then had a PICC line placed and received IV antibiotics for 8 weeks.  Kidney infection of left kidney did not resolve and he had a left nephrectomy removed on August 1st.  He then received another 4 weeks of IV antibiotics after removal.  He continues to follow with Dr. Jennings and Dr. Sanchez at  in regards.  Recent US of abdomen 4/15/2021 showed normal aorta, no aneurysm noted.    He returns today for follow up and reports doing well. Cardiac concerns denied. Chest pain, SOA, palpitations, dizziness, and  syncope denied.  Labs are managed with PCP as well as refills. Labs done in March 2021 showed:  BUN 23, Creatinine 1.30, Na 142, K 4.3, AST 16, ALT 10, GFR 60, TSH 0.541, VIT B12 282, VIT D 43.2, HDL 55, , TRIG 43, PSA 1.3HH 13.2/40.9.          Cardiac History  Past Surgical History:   Procedure Laterality Date   • CARDIOVASCULAR STRESS TEST  05/04/2006    @[facility]. - 7 Min, 89% THR. EF 51%. negative   • CARDIOVASCULAR STRESS TEST  03/17/2017    EF 57%, 4 min 16 sec, no ischemia   • ECHO - CONVERTED  03/17/2017    EF 60-65%, no AS, mild MR       Current Outpatient Medications   Medication Sig Dispense Refill   • DULoxetine (CYMBALTA) 20 MG capsule Take 20 mg by mouth Daily.     • lisinopril-hydrochlorothiazide (PRINZIDE,ZESTORETIC) 20-12.5 MG per tablet Take 1 tablet by mouth Daily. 90 tablet 2   • buPROPion (WELLBUTRIN) 75 MG tablet Take 1 tablet by mouth 2 (Two) Times a Day. 180 tablet 3   • sertraline (Zoloft) 50 MG tablet Take 1 tablet by mouth Daily. 90 tablet 2     No current facility-administered medications for this visit.       Patient has no known allergies.    Past Medical History:   Diagnosis Date   • Arthritis    • Chronic back pain    • Dyspnea on exertion    • Fatigue    • GERD (gastroesophageal reflux disease)    • Glucose intolerance (impaired glucose tolerance)    • H/O bariatric surgery     Gastric sleeve   • Heartburn    • Hypercholesterolemia    • Hypertension    • Hypertriglyceridemia    • Insomnia    • Joint pain    • Kidney stone     with abcess   • Osteoarthritis of neck     /back   • Pain in hip    • Pain in neck     /back   • Pain, joint, ankle and foot    • Pain, knee    • Peripheral edema    • Sleep apnea    • Urinary incontinence     stress       Social History     Socioeconomic History   • Marital status:      Spouse name: Not on file   • Number of children: Not on file   • Years of education: Not on file   • Highest education level: Not on file   Tobacco Use   •  "Smoking status: Never Smoker   • Smokeless tobacco: Never Used   Substance and Sexual Activity   • Alcohol use: No   • Drug use: No       Family History   Problem Relation Age of Onset   • Lupus Mother    • Hypertension Mother    • Cancer Mother    • Stroke Mother    • Heart failure Father    • Heart disease Father         valve problems due to rheumatic fever   • Hypertension Father    • Obesity Father    • Cancer Brother        Review of Systems   Constitutional: Negative for malaise/fatigue and night sweats.   Cardiovascular: Negative for chest pain, claudication, dyspnea on exertion, irregular heartbeat, leg swelling, near-syncope, orthopnea and syncope.   Respiratory: Negative for cough, shortness of breath and wheezing.    Musculoskeletal: Negative for back pain, joint pain and stiffness.   Gastrointestinal: Negative for anorexia, heartburn, melena, nausea and vomiting.   Genitourinary: Negative for dysuria, hematuria, hesitancy and nocturia.   Neurological: Negative for dizziness, light-headedness and loss of balance.   Psychiatric/Behavioral: Negative for depression and memory loss. The patient is not nervous/anxious.            Objective     /78 (BP Location: Right arm, Patient Position: Sitting)   Pulse 64   Temp 98.2 °F (36.8 °C) (Temporal)   Resp 16   Ht 180.3 cm (70.98\")   Wt 122 kg (269 lb)   BMI 37.54 kg/m²     Constitutional:       Appearance: Healthy appearance. Not in distress.   Eyes:      Pupils: Pupils are equal, round, and reactive to light.   HENT:      Nose: Nose normal.   Pulmonary:      Effort: Pulmonary effort is normal.      Breath sounds: Normal breath sounds.   Cardiovascular:      PMI at left midclavicular line. Normal rate. Regular rhythm.      Murmurs: There is a systolic murmur.   Abdominal:      Palpations: Abdomen is soft.   Musculoskeletal: Normal range of motion.      Cervical back: Normal range of motion and neck supple. Skin:     General: Skin is warm and dry. "   Neurological:      Mental Status: Oriented to person, place and time.         Procedures    Assessment/Plan     HTN:  Blood pressure not at goal, no changes to current . Patient continue taking zestoretic at 20/12.5mg daily. limited sodium advised.     Hyperlipidemia:  LDL at 100, will be rechecked again soon. He was urged to limit sugar/carbs in regards.       BMI noted at 37.54, good cardiac diet with limited caloric intake, carbs, and activity as tolerated advised.     6 month follow up recommended or sooner if needed.    No refills needed.         Problems Addressed this Visit        Cardiac and Vasculature    HTN (hypertension) - Primary    Hyperlipemia      Other Visit Diagnoses     Severe obesity (BMI 35.0-39.9) with comorbidity (CMS/[facility])          Diagnoses       Codes Comments    Essential hypertension    -  Primary ICD-10-CM: I10  ICD-9-CM: 401.9     Other hyperlipidemia     ICD-10-CM: E78.49  ICD-9-CM: 272.4     Severe obesity (BMI 35.0-39.9) with comorbidity (CMS/HCC)     ICD-10-CM: E66.01  ICD-9-CM: 278.01           Patient's Body mass index is 37.54 kg/m². obesity.  Good cardiac diet with limited carbs, calories and walking regimen advised.                 Electronically signed by KIMO Trinidad May 10, 2021 11:21 EDT

## 2021-08-09 RX ORDER — LISINOPRIL AND HYDROCHLOROTHIAZIDE 20; 12.5 MG/1; MG/1
TABLET ORAL
Qty: 30 TABLET | Refills: 11 | Status: SHIPPED | OUTPATIENT
Start: 2021-08-09 | End: 2021-09-20 | Stop reason: SDUPTHER

## 2021-09-20 RX ORDER — LISINOPRIL AND HYDROCHLOROTHIAZIDE 20; 12.5 MG/1; MG/1
1 TABLET ORAL DAILY
Qty: 90 TABLET | Refills: 3 | Status: SHIPPED | OUTPATIENT
Start: 2021-09-20 | End: 2021-09-23 | Stop reason: SDUPTHER

## 2021-09-20 NOTE — TELEPHONE ENCOUNTER
Received [facility] Lisinopril/HCTZ 20/12.5mg one tablet daily.

## 2021-09-23 RX ORDER — LISINOPRIL AND HYDROCHLOROTHIAZIDE 20; 12.5 MG/1; MG/1
1 TABLET ORAL DAILY
Qty: 90 TABLET | Refills: 3 | Status: SHIPPED | OUTPATIENT
Start: 2021-09-23 | End: 2021-11-24 | Stop reason: DRUGHIGH

## 2021-11-24 ENCOUNTER — OFFICE VISIT (OUTPATIENT)
Dept: CARDIOLOGY | Facility: CLINIC | Age: 56
End: 2021-11-24

## 2021-11-24 VITALS
HEART RATE: 76 BPM | HEIGHT: 70 IN | TEMPERATURE: 95.9 F | DIASTOLIC BLOOD PRESSURE: 86 MMHG | SYSTOLIC BLOOD PRESSURE: 142 MMHG | BODY MASS INDEX: 39.28 KG/M2 | WEIGHT: 274.4 LBS

## 2021-11-24 DIAGNOSIS — E66.01 SEVERE OBESITY (BMI 35.0-39.9) WITH COMORBIDITY (HCC): ICD-10-CM

## 2021-11-24 DIAGNOSIS — I10 PRIMARY HYPERTENSION: Primary | ICD-10-CM

## 2021-11-24 DIAGNOSIS — E78.49 OTHER HYPERLIPIDEMIA: ICD-10-CM

## 2021-11-24 DIAGNOSIS — E88.81 METABOLIC SYNDROME: ICD-10-CM

## 2021-11-24 DIAGNOSIS — Z90.5 S/P NEPHRECTOMY: ICD-10-CM

## 2021-11-24 PROCEDURE — 99214 OFFICE O/P EST MOD 30 MIN: CPT | Performed by: NURSE PRACTITIONER

## 2021-11-24 RX ORDER — LISINOPRIL AND HYDROCHLOROTHIAZIDE 25; 20 MG/1; MG/1
1 TABLET ORAL DAILY
Qty: 90 TABLET | Refills: 2 | Status: SHIPPED | OUTPATIENT
Start: 2021-11-24 | End: 2022-12-21 | Stop reason: ALTCHOICE

## 2021-11-24 NOTE — PROGRESS NOTES
Chief Complaint   Patient presents with   • Follow-up     Pt here for cardiac follow up.  Pt denies CP, SOB, dizziness or palpitations.  Pt does not take a daily ASA.   • Med Refill     Pt requset 90 day refills on cardiac meds.  He request refills to be sent to [facility].   • Lab Work     Pt states he had labs a few weeks ago with his PCP.       Cardiac Complaints  none      Subjective   Lefty Elizabeth Jr. is a 56 y.o. male with HTN, bradycardia, and significant metabolic syndrome for which he underwent bariatric surgery in the form of gastric sleeve and has lost about 225 pounds.  He was on multiple medications for blood pressure anjd cholesterol management which were all stopped after his weight loss.  In February, he started noticing fatigue and hematuria.  Workup showed kidney stone with infection and hydronephrosis.  He was admitted to the hospital, had nephrostomy tube placed and later had removal of the kidney stones.  During admission, he thenstarted noticing chest tightness and sharp chest pain and was referred for the same.  Cardiac workup was advised at consult to rule out any ischemia, pericardial effusion, and to assess cardiac structures.  Stress showed good LV function without ischemic burden.  He returns today for follow up and states that he went to  in the summer for high fever, night sweats, and abnormal labs.  He was diagnosed with a recurrent kidney infection as his kidney stone became lodged and he could not rid himself of infection. He then had a PICC line placed and received IV antibiotics for 8 weeks.  Kidney infection of left kidney did not resolve and he had a left nephrectomy removed on August 1st.  He then received another 4 weeks of IV antibiotics after removal.  He continues to follow with Dr. Jennings and Dr. Sanchez at  in regards.  Recent US of abdomen 4/15/2021 showed normal aorta, no aneurysm noted.    Patient comes today for follow up and denies any new concerns. Chest  pain, SOA, palpitations, dizziness, and syncope. He does not take daily ASA.  Labs done with PCP and were done few weeks ago, no copy available for review. Refills requested.           Cardiac History  Past Surgical History:   Procedure Laterality Date   • CARDIOVASCULAR STRESS TEST  05/04/2006    @[facility]. - 7 Min, 89% THR. EF 51%. negative   • CARDIOVASCULAR STRESS TEST  03/17/2017    EF 57%, 4 min 16 sec, no ischemia   • ECHO - CONVERTED  03/17/2017    EF 60-65%, no AS, mild MR       Current Outpatient Medications   Medication Sig Dispense Refill   • DULoxetine (CYMBALTA) 20 MG capsule Take 20 mg by mouth Daily.     • lisinopril-hydrochlorothiazide (PRINZIDE,ZESTORETIC) 20-25 MG per tablet Take 1 tablet by mouth Daily. 90 tablet 2     No current facility-administered medications for this visit.       Patient has no known allergies.    Past Medical History:   Diagnosis Date   • Arthritis    • Chronic back pain    • Dyspnea on exertion    • Fatigue    • GERD (gastroesophageal reflux disease)    • Glucose intolerance (impaired glucose tolerance)    • H/O bariatric surgery     Gastric sleeve   • Heartburn    • Hypercholesterolemia    • Hypertension    • Hypertriglyceridemia    • Insomnia    • Joint pain    • Kidney stone     with abcess   • Osteoarthritis of neck     /back   • Pain in hip    • Pain in neck     /back   • Pain, joint, ankle and foot    • Pain, knee    • Peripheral edema    • Sleep apnea    • Urinary incontinence     stress       Social History     Socioeconomic History   • Marital status:    Tobacco Use   • Smoking status: Never Smoker   • Smokeless tobacco: Never Used   Vaping Use   • Vaping Use: Never used   Substance and Sexual Activity   • Alcohol use: No   • Drug use: No       Family History   Problem Relation Age of Onset   • Lupus Mother    • Hypertension Mother    • Cancer Mother    • Stroke Mother    • Heart failure Father    • Heart disease Father         valve problems due to  "rheumatic fever   • Hypertension Father    • Obesity Father    • Cancer Brother        Review of Systems   Constitutional: Negative for malaise/fatigue and night sweats.   Cardiovascular: Negative for chest pain, claudication, dyspnea on exertion, irregular heartbeat, leg swelling, near-syncope, orthopnea, palpitations and syncope.   Respiratory: Negative for cough, shortness of breath and wheezing.    Musculoskeletal: Positive for stiffness. Negative for back pain and joint pain.   Gastrointestinal: Negative for anorexia, heartburn, melena, nausea and vomiting.   Genitourinary: Negative for dysuria, hematuria, hesitancy and nocturia.   Neurological: Negative for dizziness, light-headedness and loss of balance.   Psychiatric/Behavioral: Negative for depression and memory loss. The patient is not nervous/anxious.            Objective     /86 (BP Location: Left arm, Patient Position: Sitting)   Pulse 76   Temp 95.9 °F (35.5 °C)   Ht 177.8 cm (70\")   Wt 124 kg (274 lb 6.4 oz)   BMI 39.37 kg/m²     Constitutional:       Appearance: Not in distress.   Eyes:      Pupils: Pupils are equal, round, and reactive to light.   HENT:      Nose: Nose normal.   Pulmonary:      Effort: Pulmonary effort is normal.      Breath sounds: Normal breath sounds.   Cardiovascular:      PMI at left midclavicular line. Normal rate. Regular rhythm.      Murmurs: There is a systolic murmur.   Abdominal:      Palpations: Abdomen is soft.   Musculoskeletal: Normal range of motion.      Cervical back: Normal range of motion and neck supple. Skin:     General: Skin is warm and dry.   Neurological:      Mental Status: Oriented to person, place and time.         Procedures    Assessment/Plan     HTN:  Blood pressure not at goal, he admits at home is well managed. Patient continue taking zestoretic at 20/25mg daily. limited sodium advised.      Hyperlipidemia: Labs with your office, not sure what it showed, but he thinks it was normal. " Limited fried fat, fried fat, and carbs advised.    Cardiac status:  Stable. No new workup advised.  He will call with concerns.      BMI noted at 39.74, good cardiac diet with limited caloric intake, carbs, and activity as tolerated advised.     6 month follow up recommended or sooner if needed.     Refills sent per request.        Problems Addressed this Visit        Cardiac and Vasculature    HTN (hypertension) - Primary    Relevant Medications    lisinopril-hydrochlorothiazide (PRINZIDE,ZESTORETIC) 20-25 MG per tablet    Hyperlipemia       Endocrine and Metabolic    Metabolic syndrome       Genitourinary and Reproductive     S/p nephrectomy      Other Visit Diagnoses     Severe obesity (BMI 35.0-39.9) with comorbidity (HCC)          Diagnoses       Codes Comments    Primary hypertension    -  Primary ICD-10-CM: I10  ICD-9-CM: 401.9     Other hyperlipidemia     ICD-10-CM: E78.49  ICD-9-CM: 272.4     S/p nephrectomy     ICD-10-CM: Z90.5  ICD-9-CM: V45.73     Metabolic syndrome     ICD-10-CM: E88.81  ICD-9-CM: 277.7     Severe obesity (BMI 35.0-39.9) with comorbidity (HCC)     ICD-10-CM: E66.01  ICD-9-CM: 278.01           Patient's Body mass index is 39.37 kg/m². indicating that he is obese. Good cardiac diet with limited carbs, calories, and activity as tolerated advised.           Electronically signed by KIMO Trinidad November 24, 2021 11:47 EST

## 2022-05-24 ENCOUNTER — TELEPHONE (OUTPATIENT)
Dept: CARDIOLOGY | Facility: CLINIC | Age: 57
End: 2022-05-24

## 2022-05-24 NOTE — TELEPHONE ENCOUNTER
Received fax from [facility] for cardiac clearance for patient to have a left reverse total shoulder arthroplasty with latissmus dorsi transfer. According to our records, I do not see where patient is on any blood thinners or where patient has had any stenting.       Fax [phone]

## 2022-10-31 RX ORDER — LISINOPRIL AND HYDROCHLOROTHIAZIDE 20; 12.5 MG/1; MG/1
TABLET ORAL
Qty: 90 TABLET | Refills: 3 | OUTPATIENT
Start: 2022-10-31

## 2022-12-19 ENCOUNTER — TELEPHONE (OUTPATIENT)
Dept: CARDIOLOGY | Facility: CLINIC | Age: 57
End: 2022-12-19

## 2022-12-19 ENCOUNTER — CLINICAL SUPPORT (OUTPATIENT)
Dept: CARDIOLOGY | Facility: CLINIC | Age: 57
End: 2022-12-19

## 2022-12-19 DIAGNOSIS — R00.1 BRADYCARDIA, SINUS: ICD-10-CM

## 2022-12-19 DIAGNOSIS — I48.0 PAF (PAROXYSMAL ATRIAL FIBRILLATION): Primary | ICD-10-CM

## 2022-12-19 PROCEDURE — 93000 ELECTROCARDIOGRAM COMPLETE: CPT | Performed by: INTERNAL MEDICINE

## 2022-12-19 NOTE — TELEPHONE ENCOUNTER
Patient was made aware to decrease sotalol to 40 mg BID. Patient was also made aware of appointment on 12/21/22 at 10:15 am.

## 2022-12-19 NOTE — TELEPHONE ENCOUNTER
Per Dr. Barbosa per EKG, patient needs to decrease sotalol to 40 mg BID. Patient is scheduled for follow up visit on 12/21/22 at 10:15 am.

## 2022-12-19 NOTE — PROGRESS NOTES
Procedure     ECG 12 Lead    Date/Time: 12/19/2022 10:12 AM  Performed by: Eliz Barbosa MD  Authorized by: Eliz Barbosa MD   Comparison: compared with previous ECG from 7/16/2020  Similar to previous ECG  Rhythm: sinus bradycardia  Rate: bradycardic  QRS axis: normal    Clinical impression: non-specific ECG

## 2022-12-21 ENCOUNTER — OFFICE VISIT (OUTPATIENT)
Dept: CARDIOLOGY | Facility: CLINIC | Age: 57
End: 2022-12-21

## 2022-12-21 VITALS
HEART RATE: 57 BPM | BODY MASS INDEX: 40.23 KG/M2 | SYSTOLIC BLOOD PRESSURE: 128 MMHG | WEIGHT: 281 LBS | DIASTOLIC BLOOD PRESSURE: 60 MMHG | HEIGHT: 70 IN

## 2022-12-21 DIAGNOSIS — G47.30 SLEEP APNEA IN ADULT: ICD-10-CM

## 2022-12-21 DIAGNOSIS — E66.01 MORBID OBESITY WITH BMI OF 40.0-44.9, ADULT: ICD-10-CM

## 2022-12-21 DIAGNOSIS — R01.1 MURMUR, CARDIAC: ICD-10-CM

## 2022-12-21 DIAGNOSIS — R00.1 BRADYCARDIA: ICD-10-CM

## 2022-12-21 DIAGNOSIS — I48.0 PAF (PAROXYSMAL ATRIAL FIBRILLATION): Primary | ICD-10-CM

## 2022-12-21 DIAGNOSIS — E78.00 HYPERCHOLESTEREMIA: ICD-10-CM

## 2022-12-21 DIAGNOSIS — I10 PRIMARY HYPERTENSION: ICD-10-CM

## 2022-12-21 PROBLEM — E78.5 HYPERLIPEMIA: Status: RESOLVED | Noted: 2017-09-14 | Resolved: 2022-12-21

## 2022-12-21 PROCEDURE — 99214 OFFICE O/P EST MOD 30 MIN: CPT | Performed by: INTERNAL MEDICINE

## 2022-12-21 PROCEDURE — 93000 ELECTROCARDIOGRAM COMPLETE: CPT | Performed by: INTERNAL MEDICINE

## 2022-12-21 RX ORDER — ROSUVASTATIN CALCIUM 10 MG/1
10 TABLET, COATED ORAL DAILY
Qty: 90 TABLET | Refills: 3 | Status: SHIPPED | OUTPATIENT
Start: 2022-12-21 | End: 2023-03-30

## 2022-12-21 RX ORDER — SOTALOL HYDROCHLORIDE 80 MG/1
80 TABLET ORAL 2 TIMES DAILY
COMMUNITY

## 2022-12-21 NOTE — PROGRESS NOTES
Chief Complaint   Patient presents with   • Follow-up     Cardiac management. Has no cardiac complaints today.   • Atrial Fibrillation     Denies any symptoms of AFIB.    • LABS     November 2022 results on chart   • Med Refill     No refills needed today. PCP has sent prescription to pharmacy .       CARDIAC COMPLAINTS  PAF        Subjective   Leftyzehra Elizabeth Jr. is a 57 y.o. male came in today for his follow-up visit.  He has history of hypertension and hypercholesterolemia.  He also has significant metabolic syndrome.  The last time he was seen at our office was in November of last year.  He has lost about 225 pounds after bariatric surgery but then slowly started gaining weight again.  At the time he was continued on his blood pressure medication and strongly advised about dietary restriction was given.  He was seen at your office recently for routine checkup and found to be in A. fib with controlled ventricular response.  He is lab work showed normal blood count, mild renal insufficiency with a BUN/creatinine of 37 and 2.21 with GFR of 33.  His TSH was low at 0.102 but the T4 and T3 uptake was normal.  His total cholesterol is gone up to 192 with the .  He was put on sotalol and Xarelto.  Came today stating no new symptoms.  He does have some bruises.  He denies any palpitation, dizziness or loss of consciousness.              Cardiac History  Past Surgical History:   Procedure Laterality Date   • CARDIOVASCULAR STRESS TEST  05/04/2006    @[facility]. - 7 Min, 89% THR. EF 51%. negative   • CARDIOVASCULAR STRESS TEST  03/17/2017    EF 57%, 4 min 16 sec, no ischemia   • ECHO - CONVERTED  03/17/2017    EF 60-65%, no AS, mild MR       Current Outpatient Medications   Medication Sig Dispense Refill   • rivaroxaban (XARELTO) 20 MG tablet Take 20 mg by mouth Daily.     • sotalol (BETAPACE) 80 MG tablet Take 80 mg by mouth 2 (Two) Times a Day. 1/2 tablet twice daily     • DULoxetine (CYMBALTA) 20 MG capsule  Take 20 mg by mouth Daily.     • lisinopril-hydrochlorothiazide (PRINZIDE,ZESTORETIC) 20-25 MG per tablet Take 1 tablet by mouth Daily. 90 tablet 2   • rosuvastatin (CRESTOR) 10 MG tablet Take 1 tablet by mouth Daily. 90 tablet 3     No current facility-administered medications for this visit.       Allergies  :  Patient has no known allergies.       Past Medical History:   Diagnosis Date   • Arthritis    • Atrial fibrillation (HCC)    • Chronic back pain    • Dyspnea on exertion    • Fatigue    • GERD (gastroesophageal reflux disease)    • Glucose intolerance (impaired glucose tolerance)    • H/O bariatric surgery     Gastric sleeve   • Heartburn    • Hypercholesterolemia    • Hypertension    • Hypertriglyceridemia    • Insomnia    • Joint pain    • Kidney stone     with abcess   • Osteoarthritis of neck     /back   • Pain in hip    • Pain in neck     /back   • Pain, joint, ankle and foot    • Pain, knee    • Peripheral edema    • Sleep apnea    • Urinary incontinence     stress       Social History     Socioeconomic History   • Marital status:    Tobacco Use   • Smoking status: Never   • Smokeless tobacco: Never   Vaping Use   • Vaping Use: Never used   Substance and Sexual Activity   • Alcohol use: No   • Drug use: No       Family History   Problem Relation Age of Onset   • Lupus Mother    • Hypertension Mother    • Cancer Mother    • Stroke Mother    • Heart failure Father    • Heart disease Father         valve problems due to rheumatic fever   • Hypertension Father    • Obesity Father    • Cancer Brother        Review of Systems   Constitutional: Negative for decreased appetite and malaise/fatigue.   HENT: Negative for congestion and sore throat.    Eyes: Negative for blurred vision, double vision and visual disturbance.   Cardiovascular: Negative for chest pain and palpitations.   Respiratory: Negative for shortness of breath and snoring.    Endocrine: Negative for cold intolerance and heat  "intolerance.   Hematologic/Lymphatic: Negative for adenopathy. Bruises/bleeds easily.   Skin: Negative for itching, nail changes and skin cancer.   Musculoskeletal: Negative for arthritis and myalgias.   Gastrointestinal: Negative for abdominal pain, dysphagia and heartburn.   Genitourinary: Negative for bladder incontinence and frequency.   Neurological: Negative for dizziness, seizures and vertigo.   Psychiatric/Behavioral: Negative for altered mental status.   Allergic/Immunologic: Negative for environmental allergies and hives.       Diabetes- No  Thyroid- normal    Objective     /60 (BP Location: Right arm, Patient Position: Sitting)   Pulse 57   Ht 177.8 cm (70\")   Wt 127 kg (281 lb)   BMI 40.32 kg/m²     Vitals and nursing note reviewed.   Constitutional:       Appearance: Healthy appearance. Not in distress.   Eyes:      Conjunctiva/sclera: Conjunctivae normal.      Pupils: Pupils are equal, round, and reactive to light.   HENT:      Head: Normocephalic.   Pulmonary:      Effort: Pulmonary effort is normal.      Breath sounds: Normal breath sounds.   Cardiovascular:      PMI at left midclavicular line. Normal rate. Regular rhythm.      Murmurs: There is a grade 3/6 high frequency blowing holosystolic murmur at the apex.   Abdominal:      General: Bowel sounds are normal.      Palpations: Abdomen is soft.   Musculoskeletal: Normal range of motion.      Cervical back: Normal range of motion and neck supple. Skin:     General: Skin is warm and dry.   Neurological:      Mental Status: Alert, oriented to person, place, and time and oriented to person, place and time.         ECG 12 Lead    Date/Time: 12/21/2022 12:46 PM  Performed by: Eliz Barbosa MD  Authorized by: Eliz Barbosa MD   Comparison: compared with previous ECG from 12/19/2022  Similar to previous ECG  Rhythm: sinus bradycardia  Rate: bradycardic  QRS axis: right    Clinical impression: non-specific ECG                  "   @ASSESSMENT/PLAN@  Class 3 Severe Obesity (BMI >=40). Obesity-related health conditions include the following: obstructive sleep apnea, hypertension, dyslipidemias and GERD. Obesity is unchanged. BMI is is above average; BMI management plan is completed. We discussed low calorie, low carb based diet program, portion control and increasing exercise.     Diagnoses and all orders for this visit:    1. PAF (paroxysmal atrial fibrillation) (HCC) (Primary)  -     Adult Transthoracic Echo Complete W/ Cont if Necessary Per Protocol; Future    2. Primary hypertension    3. Morbid obesity with BMI of 40.0-44.9, adult ([facility])    4. Sleep apnea in adult  -     Overnight Sleep Oximetry Study; Future    5. Murmur, cardiac  -     Adult Transthoracic Echo Complete W/ Cont if Necessary Per Protocol; Future    6. Hypercholesteremia  -     rosuvastatin (CRESTOR) 10 MG tablet; Take 1 tablet by mouth Daily.  Dispense: 90 tablet; Refill: 3    At baseline he is mildly bradycardic.  His EKG shows sinus bradycardia, right axis deviation, nonspecific ST changes.  His clinical examination reveals a BMI which is gone up to 40.  His cardiovascular examination is otherwise unremarkable other than a soft systolic murmur at the mitral area    Regarding his PAF, he is maintaining sinus rhythm.  His QTC is normal.  At this time we will continue the sotalol.  If he remains in sinus rhythm then we may be able to take him off Xarelto since his CHADS2 score is only 1    Regarding hypertension, he is not on Zestoretic anymore.  He need his labs to be rechecked again to make sure the renal function is not getting any worse    Regarding obesity, I had a long talk with him again.  I gave him papers on Mediterranean diet.  I talked to him about intermittent fasting diet.    Regarding his history of sleep apnea, he used CPAP in the past which he stopped after losing weight with bariatric surgery.  Now with him having PAF need to make sure that he is not  getting sleep apnea again.  I advised him to check his nocturnal pulse PO2 measurement    Regarding the cardiac murmur, it appears to be secondary to mitral regurgitation so like to repeat echocardiogram to reevaluate his valvular structure    Regarding his history of hypercholesterolemia, since going up I started him on Crestor at 10 mg once a day.  Once he is able to get his weight down then we should be able to take him off the statin.    Overall cardiac status is stable.  I will see him back in 6 months or sooner if needed.                    Electronically signed by Eliz Barbosa MD December 21, 2022 12:31 EST

## 2022-12-28 ENCOUNTER — HOSPITAL ENCOUNTER (OUTPATIENT)
Dept: CARDIOLOGY | Facility: HOSPITAL | Age: 57
Discharge: HOME | End: 2022-12-28
Admitting: INTERNAL MEDICINE

## 2022-12-28 DIAGNOSIS — I48.0 PAF (PAROXYSMAL ATRIAL FIBRILLATION): ICD-10-CM

## 2022-12-28 DIAGNOSIS — R01.1 MURMUR, CARDIAC: ICD-10-CM

## 2022-12-28 LAB
BH CV ECHO MEAS - ACS: 2.39 CM
BH CV ECHO MEAS - AO MAX PG: 8 MMHG
BH CV ECHO MEAS - AO MEAN PG: 4.5 MMHG
BH CV ECHO MEAS - AO ROOT DIAM: 4 CM
BH CV ECHO MEAS - AO V2 MAX: 141.2 CM/SEC
BH CV ECHO MEAS - AO V2 VTI: 29.7 CM
BH CV ECHO MEAS - EDV(CUBED): 143.1 ML
BH CV ECHO MEAS - EDV(MOD-SP4): 154 ML
BH CV ECHO MEAS - EF(MOD-SP4): 56.6 %
BH CV ECHO MEAS - EF_3D-VOL: 62 %
BH CV ECHO MEAS - ESV(CUBED): 22.2 ML
BH CV ECHO MEAS - ESV(MOD-SP4): 66.9 ML
BH CV ECHO MEAS - FS: 46.3 %
BH CV ECHO MEAS - IVS/LVPW: 0.73 CM
BH CV ECHO MEAS - IVSD: 1.18 CM
BH CV ECHO MEAS - LA DIMENSION: 4.3 CM
BH CV ECHO MEAS - LAT PEAK E' VEL: 7.7 CM/SEC
BH CV ECHO MEAS - LV DIASTOLIC VOL/BSA (35-75): 63.9 CM2
BH CV ECHO MEAS - LV MASS(C)D: 310.8 GRAMS
BH CV ECHO MEAS - LV SYSTOLIC VOL/BSA (12-30): 27.7 CM2
BH CV ECHO MEAS - LVIDD: 5.2 CM
BH CV ECHO MEAS - LVIDS: 2.8 CM
BH CV ECHO MEAS - LVOT AREA: 5.5 CM2
BH CV ECHO MEAS - LVOT DIAM: 2.7 CM
BH CV ECHO MEAS - LVPWD: 1.61 CM
BH CV ECHO MEAS - MED PEAK E' VEL: 6.4 CM/SEC
BH CV ECHO MEAS - MV A MAX VEL: 55.3 CM/SEC
BH CV ECHO MEAS - MV DEC SLOPE: 144.2 CM/SEC2
BH CV ECHO MEAS - MV E MAX VEL: 51 CM/SEC
BH CV ECHO MEAS - MV E/A: 0.92
BH CV ECHO MEAS - RAP SYSTOLE: 10 MMHG
BH CV ECHO MEAS - RVDD: 2.8 CM
BH CV ECHO MEAS - RVSP: 38.6 MMHG
BH CV ECHO MEAS - SI(MOD-SP4): 36.1 ML/M2
BH CV ECHO MEAS - SV(MOD-SP4): 87.1 ML
BH CV ECHO MEAS - TR MAX PG: 28.6 MMHG
BH CV ECHO MEAS - TR MAX VEL: 267.4 CM/SEC
BH CV ECHO MEASUREMENTS AVERAGE E/E' RATIO: 7.23
LEFT ATRIUM VOLUME INDEX: 23.9 ML/M2
MAXIMAL PREDICTED HEART RATE: 161 BPM
STRESS TARGET HR: 137 BPM

## 2022-12-28 PROCEDURE — 93306 TTE W/DOPPLER COMPLETE: CPT

## 2022-12-28 PROCEDURE — 93306 TTE W/DOPPLER COMPLETE: CPT | Performed by: INTERNAL MEDICINE

## 2023-01-12 ENCOUNTER — TELEPHONE (OUTPATIENT)
Dept: CARDIOLOGY | Facility: CLINIC | Age: 58
End: 2023-01-12
Payer: COMMERCIAL

## 2023-01-12 NOTE — TELEPHONE ENCOUNTER
Patient called to d/c the xarelto and change to eliquis due to costs. Change has been made. Script sent.

## 2023-01-18 ENCOUNTER — TELEPHONE (OUTPATIENT)
Dept: CARDIOLOGY | Facility: CLINIC | Age: 58
End: 2023-01-18
Payer: COMMERCIAL

## 2023-01-18 NOTE — TELEPHONE ENCOUNTER
Reached patient to discuss about Eliquis medication not being approved through their pharmacy. They stated they wanted to start Eliquis since Xarelto was too expensive to pay at their pharmacy but has since found [facility] and is now getting Xarelto at a cheap cost and no longer wants to switch to Eliquis.

## 2023-02-14 NOTE — TELEPHONE ENCOUNTER
Patient left a  requesting a refill on Xarelto to [facility], 90 day supply.  Xarelto 20 mg daily script placed for cosign.

## 2023-03-30 ENCOUNTER — OFFICE VISIT (OUTPATIENT)
Dept: ENDOCRINOLOGY | Facility: CLINIC | Age: 58
End: 2023-03-30
Payer: COMMERCIAL

## 2023-03-30 VITALS
HEART RATE: 52 BPM | OXYGEN SATURATION: 99 % | BODY MASS INDEX: 39.8 KG/M2 | WEIGHT: 278 LBS | DIASTOLIC BLOOD PRESSURE: 64 MMHG | SYSTOLIC BLOOD PRESSURE: 124 MMHG | HEIGHT: 70 IN

## 2023-03-30 DIAGNOSIS — E05.20 TOXIC MULTINODULAR GOITER: Primary | ICD-10-CM

## 2023-03-30 PROCEDURE — 99204 OFFICE O/P NEW MOD 45 MIN: CPT | Performed by: INTERNAL MEDICINE

## 2023-03-30 RX ORDER — OMEGA-3S/DHA/EPA/FISH OIL/D3 300MG-1000
CAPSULE ORAL EVERY 24 HOURS
COMMUNITY
Start: 2022-11-15

## 2023-03-30 NOTE — ASSESSMENT & PLAN NOTE
Likely due to hot nodules in right lobe . Which would cause atrial fib.  Needs a scan and then we can decide about surgery vs I 131 therapy    Reviewed : fna, ultrasound, labs

## 2023-03-30 NOTE — PROGRESS NOTES
"     Office Note      Date: 2023  Patient Name: Lefty Elizabeth Jr.  MRN: [id number]  : 1965    Chief Complaint   Patient presents with   • thyroid nodule        History of Present Illness:   Lefty Elizabeth Jr. is a 58 y.o. male who presents for thyroid nodule   .   Patient is seen for a new patient evaluation    His thyroid story begins when he had an FNA of right thyroid nodule. It was benign. Ultrasound showes 2 large nodules in right lobe.  tsh has been low.. t4 has been normal  He struggles with weight . He has new onset atrial fib.  He notes fatigue and general sense of unwell   Subjective      P    Review of Systems:   Review of Systems   Constitutional: Positive for fatigue and unexpected weight change.   Cardiovascular: Positive for palpitations.   All other systems reviewed and are negative.      The following portions of the patient's history were reviewed and updated as appropriate: allergies, current medications, past family history, past medical history, past social history, past surgical history and problem list.    Objective     Visit Vitals  /64   Pulse 52   Ht 177.8 cm (70\")   Wt 126 kg (278 lb)   SpO2 99%   BMI 39.89 kg/m²           Physical Exam:  Physical Exam  Vitals reviewed.   Constitutional:       Appearance: Normal appearance. He is not toxic-appearing.   HENT:      Head: Normocephalic.   Eyes:      Extraocular Movements: Extraocular movements intact.   Neck:      Comments: Right thyroid fullness  Cardiovascular:      Rate and Rhythm: Bradycardia present. Rhythm irregular.      Pulses: Normal pulses.   Pulmonary:      Effort: Pulmonary effort is normal.   Musculoskeletal:         General: Normal range of motion.   Lymphadenopathy:      Cervical: No cervical adenopathy.   Skin:     General: Skin is warm.   Neurological:      General: No focal deficit present.      Mental Status: He is alert.   Psychiatric:         Mood and Affect: Mood normal.         Thought " Content: Thought content normal.         Judgment: Judgment normal.         Assessment / Plan      Assessment & Plan:  Problem List Items Addressed This Visit        Other    Toxic multinodular goiter - Primary    Current Assessment & Plan     Likely due to hot nodules in right lobe . Which would cause atrial fib.  Needs a scan and then we can decide about surgery vs I 131 therapy    Reviewed : fna, ultrasound, labs          Relevant Medications    sotalol (BETAPACE) 80 MG tablet    Other Relevant Orders    NM Thyroid Uptake & Scan        Lars Lipscomb MD   03/30/2023   Diet: DASH/TLC  Dispo: pending PT eval  DVT ppx: hep SQ    Need to do [facility] - tried to contact son who has list [phone] Jessie c/w home levothyroxine 50 mcg qd c/w home ticagrelor 60 mg BID c/w home ticagrelor 60 mg BID, ASA, statin

## 2023-04-20 ENCOUNTER — HOSPITAL ENCOUNTER (OUTPATIENT)
Dept: NUCLEAR MEDICINE | Facility: HOSPITAL | Age: 58
Discharge: HOME | End: 2023-04-20
Payer: COMMERCIAL

## 2023-04-20 DIAGNOSIS — E05.20 TOXIC MULTINODULAR GOITER: ICD-10-CM

## 2023-04-20 PROCEDURE — 78014 THYROID IMAGING W/BLOOD FLOW: CPT | Performed by: RADIOLOGY

## 2023-04-20 PROCEDURE — 0 SODIUM IODIDE 7.4 MBQ CAPSULE: Performed by: INTERNAL MEDICINE

## 2023-04-20 PROCEDURE — A9516 IODINE I-123 SOD IODIDE MIC: HCPCS | Performed by: INTERNAL MEDICINE

## 2023-04-20 PROCEDURE — 78014 THYROID IMAGING W/BLOOD FLOW: CPT

## 2023-04-20 RX ORDER — SODIUM IODIDE I 123 200 UCI/1
1 CAPSULE, GELATIN COATED ORAL
Status: COMPLETED | OUTPATIENT
Start: 2023-04-20 | End: 2023-04-20

## 2023-04-20 RX ADMIN — Medication 1 CAPSULE: at 09:15

## 2023-04-21 ENCOUNTER — HOSPITAL ENCOUNTER (OUTPATIENT)
Dept: NUCLEAR MEDICINE | Facility: HOSPITAL | Age: 58
Discharge: HOME | End: 2023-04-21
Payer: COMMERCIAL

## 2023-04-24 DIAGNOSIS — E05.20 TOXIC MULTINODULAR GOITER: Primary | ICD-10-CM

## 2023-07-27 ENCOUNTER — PRE-ADMISSION TESTING (OUTPATIENT)
Dept: PREADMISSION TESTING | Facility: HOSPITAL | Age: 58
End: 2023-07-27
Payer: COMMERCIAL

## 2023-07-27 VITALS — WEIGHT: 268.52 LBS | HEIGHT: 71 IN | BODY MASS INDEX: 37.59 KG/M2

## 2023-07-27 LAB
DEPRECATED RDW RBC AUTO: 49.1 FL (ref 37–54)
ERYTHROCYTE [DISTWIDTH] IN BLOOD BY AUTOMATED COUNT: 14.9 % (ref 12.3–15.4)
HCT VFR BLD AUTO: 41.8 % (ref 37.5–51)
HGB BLD-MCNC: 13 G/DL (ref 13–17.7)
INR PPP: 1.93 (ref 0.89–1.12)
MCH RBC QN AUTO: 28.3 PG (ref 26.6–33)
MCHC RBC AUTO-ENTMCNC: 31.1 G/DL (ref 31.5–35.7)
MCV RBC AUTO: 90.9 FL (ref 79–97)
PLATELET # BLD AUTO: 172 10*3/MM3 (ref 140–450)
PMV BLD AUTO: 10.8 FL (ref 6–12)
POTASSIUM SERPL-SCNC: 3.7 MMOL/L (ref 3.5–5.2)
PROTHROMBIN TIME: 22.2 SECONDS (ref 12.2–14.5)
RBC # BLD AUTO: 4.6 10*6/MM3 (ref 4.14–5.8)
WBC NRBC COR # BLD: 7.56 10*3/MM3 (ref 3.4–10.8)

## 2023-07-27 PROCEDURE — 85610 PROTHROMBIN TIME: CPT

## 2023-07-27 PROCEDURE — 85027 COMPLETE CBC AUTOMATED: CPT

## 2023-07-27 PROCEDURE — 84132 ASSAY OF SERUM POTASSIUM: CPT

## 2023-07-27 PROCEDURE — 36415 COLL VENOUS BLD VENIPUNCTURE: CPT

## 2023-07-27 RX ORDER — TAMSULOSIN HYDROCHLORIDE 0.4 MG/1
CAPSULE ORAL
COMMUNITY
Start: 2023-07-07

## 2023-07-27 RX ORDER — LISINOPRIL AND HYDROCHLOROTHIAZIDE 12.5; 1 MG/1; MG/1
TABLET ORAL DAILY PRN
COMMUNITY
Start: 2023-01-30

## 2023-07-27 NOTE — PAT
An arrival time for procedure was not provided during PAT visit. If patient had any questions or concerns about their arrival time, they were instructed to contact their surgeon/physician.  Additionally, if the patient referred to an arrival time that was acquired from their my chart account, patient was encouraged to verify that time with their surgeon/physician. Arrival times are NOT provided in Pre Admission Testing Department.    Patient viewed general PAT education video as instructed in their preoperative information received from their surgeon.  Patient stated the general PAT education video was viewed in its entirety and survey completed.  Copies of PAT general education handouts (Incentive Spirometry, Meds to Beds Program, Patient Belongings, Pre-op skin preparation instructions, Blood Glucose testing, Visitor policy, Surgery FAQ, Code H) distributed to patient if not printed. Education related to the PAT pass and skin preparation for surgery (if applicable) completed in PAT as a reinforcement to PAT education video. Patient instructed to return PAT pass provided today as well as completed skin preparation sheet (if applicable) on the day of procedure.     Additionally if patient had not viewed video yet but intended to view it at home or in our waiting area, then referred them to the handout with QR code/link provided during PAT visit.  Instructed patient to complete survey after viewing the video in its entirety.  Encouraged patient/family to read PAT general education handouts thoroughly and notify PAT staff with any questions or concerns. Patient verbalized understanding of all information and priority content.    Patient denies any current skin issues.     Patient to apply Chlorhexadine wipes  to surgical area (as instructed) the night before procedure and the AM of procedure. Wipes provided.    Post-Surgery Information Instruction Sheet given to patient during Pre-Admission Testing Visit with verbal  instructions to patient to return with PAT PASS on the day of surgery. Additionally, encouraged patient to review the information provided.    Verified patient previously completed cardiology visit for cardiac risk assessment in preparation for upcoming procedure, completion of 12-lead ECG within six months, and risk assessment letter reviewed. No further interventions required.   CARDIAC RISK ASSESSMENT FROM DR. HERMAN ON 7/6/23 ON CHART. PT TO HOLD XARELTO (3-4) DAYS PRIOR TO PROCEDURE. PT VERBALIZED UNDERSTANDING.     EKG FROM 7/3/23 ON CHART. PT DENIES CP/SOA.     PT STATED HE WOULD LIKE TO SPEAK TO DR. WARD ABOUT TOTAL THYROIDECTOMY VS. RIGHT THYROID LOBECTOMY. PLEASE SIGN CONSENT DOS.

## 2023-08-02 ENCOUNTER — ANESTHESIA EVENT (OUTPATIENT)
Dept: PERIOP | Facility: HOSPITAL | Age: 58
End: 2023-08-02
Payer: COMMERCIAL

## 2023-08-02 RX ORDER — SODIUM CHLORIDE 0.9 % (FLUSH) 0.9 %
10 SYRINGE (ML) INJECTION EVERY 12 HOURS SCHEDULED
Status: CANCELLED | OUTPATIENT
Start: 2023-08-02

## 2023-08-02 RX ORDER — FAMOTIDINE 10 MG/ML
20 INJECTION, SOLUTION INTRAVENOUS ONCE
Status: CANCELLED | OUTPATIENT
Start: 2023-08-02 | End: 2023-08-02

## 2023-08-02 RX ORDER — SODIUM CHLORIDE 0.9 % (FLUSH) 0.9 %
10 SYRINGE (ML) INJECTION AS NEEDED
Status: CANCELLED | OUTPATIENT
Start: 2023-08-02

## 2023-08-02 RX ORDER — SODIUM CHLORIDE 9 MG/ML
40 INJECTION, SOLUTION INTRAVENOUS AS NEEDED
Status: CANCELLED | OUTPATIENT
Start: 2023-08-02

## 2023-08-03 ENCOUNTER — HOSPITAL ENCOUNTER (OUTPATIENT)
Facility: HOSPITAL | Age: 58
Discharge: HOME | End: 2023-08-04
Attending: SURGERY | Admitting: SURGERY
Payer: COMMERCIAL

## 2023-08-03 ENCOUNTER — ANESTHESIA (OUTPATIENT)
Dept: PERIOP | Facility: HOSPITAL | Age: 58
End: 2023-08-03
Payer: COMMERCIAL

## 2023-08-03 DIAGNOSIS — E04.1 THYROID NODULE: ICD-10-CM

## 2023-08-03 DIAGNOSIS — E05.10 TOXIC THYROID NODULE: Primary | ICD-10-CM

## 2023-08-03 LAB
GLUCOSE BLDC GLUCOMTR-MCNC: 76 MG/DL (ref 70–130)
INR PPP: 1.02 (ref 0.89–1.12)
PROTHROMBIN TIME: 13.5 SECONDS (ref 12.2–14.5)

## 2023-08-03 PROCEDURE — 25010000002 PROPOFOL 10 MG/ML EMULSION: Performed by: ANESTHESIOLOGY

## 2023-08-03 PROCEDURE — 25010000002 CEFAZOLIN IN DEXTROSE 2-4 GM/100ML-% SOLUTION: Performed by: ANESTHESIOLOGY

## 2023-08-03 PROCEDURE — 85610 PROTHROMBIN TIME: CPT | Performed by: ANESTHESIOLOGY

## 2023-08-03 PROCEDURE — 25010000002 FENTANYL CITRATE (PF) 100 MCG/2ML SOLUTION: Performed by: ANESTHESIOLOGY

## 2023-08-03 PROCEDURE — 25010000002 HYDRALAZINE PER 20 MG

## 2023-08-03 PROCEDURE — 25010000002 HYDROMORPHONE PER 4 MG: Performed by: SURGERY

## 2023-08-03 PROCEDURE — 25010000002 ONDANSETRON PER 1 MG: Performed by: ANESTHESIOLOGY

## 2023-08-03 PROCEDURE — 25010000002 FENTANYL CITRATE (PF) 50 MCG/ML SOLUTION

## 2023-08-03 PROCEDURE — 82948 REAGENT STRIP/BLOOD GLUCOSE: CPT

## 2023-08-03 PROCEDURE — 25010000002 DEXAMETHASONE PER 1 MG: Performed by: ANESTHESIOLOGY

## 2023-08-03 PROCEDURE — 88307 TISSUE EXAM BY PATHOLOGIST: CPT | Performed by: SURGERY

## 2023-08-03 DEVICE — HORIZON TI SMALL 6 CLIPS/CART
Type: IMPLANTABLE DEVICE | Site: THYROID | Status: FUNCTIONAL
Brand: WECK

## 2023-08-03 DEVICE — ABSORBABLE HEMOSTAT (OXIDIZED REGENERATED CELLULOSE, U.S.P.)
Type: IMPLANTABLE DEVICE | Site: THYROID | Status: FUNCTIONAL
Brand: SURGICEL

## 2023-08-03 DEVICE — HORIZON TI MED 6/CART
Type: IMPLANTABLE DEVICE | Site: THYROID | Status: FUNCTIONAL
Brand: WECK

## 2023-08-03 RX ORDER — TAMSULOSIN HYDROCHLORIDE 0.4 MG/1
0.4 CAPSULE ORAL DAILY
Status: DISCONTINUED | OUTPATIENT
Start: 2023-08-04 | End: 2023-08-04 | Stop reason: HOSPADM

## 2023-08-03 RX ORDER — FENTANYL CITRATE 50 UG/ML
50 INJECTION, SOLUTION INTRAMUSCULAR; INTRAVENOUS
Status: DISCONTINUED | OUTPATIENT
Start: 2023-08-03 | End: 2023-08-03 | Stop reason: HOSPADM

## 2023-08-03 RX ORDER — FAMOTIDINE 20 MG/1
20 TABLET, FILM COATED ORAL ONCE
Status: DISCONTINUED | OUTPATIENT
Start: 2023-08-03 | End: 2023-08-03 | Stop reason: HOSPADM

## 2023-08-03 RX ORDER — LEVOTHYROXINE SODIUM 0.05 MG/1
50 TABLET ORAL
Status: DISCONTINUED | OUTPATIENT
Start: 2023-08-04 | End: 2023-08-04 | Stop reason: HOSPADM

## 2023-08-03 RX ORDER — ONDANSETRON 2 MG/ML
4 INJECTION INTRAMUSCULAR; INTRAVENOUS ONCE AS NEEDED
Status: DISCONTINUED | OUTPATIENT
Start: 2023-08-03 | End: 2023-08-03 | Stop reason: HOSPADM

## 2023-08-03 RX ORDER — NALOXONE HCL 0.4 MG/ML
0.1 VIAL (ML) INJECTION
Status: DISCONTINUED | OUTPATIENT
Start: 2023-08-03 | End: 2023-08-04 | Stop reason: HOSPADM

## 2023-08-03 RX ORDER — SODIUM CHLORIDE 9 MG/ML
40 INJECTION, SOLUTION INTRAVENOUS AS NEEDED
Status: DISCONTINUED | OUTPATIENT
Start: 2023-08-03 | End: 2023-08-03 | Stop reason: HOSPADM

## 2023-08-03 RX ORDER — PROPOFOL 10 MG/ML
VIAL (ML) INTRAVENOUS AS NEEDED
Status: DISCONTINUED | OUTPATIENT
Start: 2023-08-03 | End: 2023-08-03 | Stop reason: SURG

## 2023-08-03 RX ORDER — ONDANSETRON 2 MG/ML
4 INJECTION INTRAMUSCULAR; INTRAVENOUS EVERY 6 HOURS PRN
Status: DISCONTINUED | OUTPATIENT
Start: 2023-08-03 | End: 2023-08-04 | Stop reason: HOSPADM

## 2023-08-03 RX ORDER — ONDANSETRON 2 MG/ML
INJECTION INTRAMUSCULAR; INTRAVENOUS AS NEEDED
Status: DISCONTINUED | OUTPATIENT
Start: 2023-08-03 | End: 2023-08-03 | Stop reason: SURG

## 2023-08-03 RX ORDER — GLYCOPYRROLATE 0.2 MG/ML
INJECTION INTRAMUSCULAR; INTRAVENOUS AS NEEDED
Status: DISCONTINUED | OUTPATIENT
Start: 2023-08-03 | End: 2023-08-03 | Stop reason: SURG

## 2023-08-03 RX ORDER — PROMETHAZINE HYDROCHLORIDE 25 MG/1
25 TABLET ORAL ONCE AS NEEDED
Status: DISCONTINUED | OUTPATIENT
Start: 2023-08-03 | End: 2023-08-03 | Stop reason: HOSPADM

## 2023-08-03 RX ORDER — EPHEDRINE SULFATE 50 MG/ML
INJECTION INTRAVENOUS AS NEEDED
Status: DISCONTINUED | OUTPATIENT
Start: 2023-08-03 | End: 2023-08-03 | Stop reason: SURG

## 2023-08-03 RX ORDER — SOTALOL HYDROCHLORIDE 80 MG/1
40 TABLET ORAL 2 TIMES DAILY
Status: DISCONTINUED | OUTPATIENT
Start: 2023-08-03 | End: 2023-08-04 | Stop reason: HOSPADM

## 2023-08-03 RX ORDER — LIDOCAINE HYDROCHLORIDE 10 MG/ML
INJECTION, SOLUTION EPIDURAL; INFILTRATION; INTRACAUDAL; PERINEURAL AS NEEDED
Status: DISCONTINUED | OUTPATIENT
Start: 2023-08-03 | End: 2023-08-03 | Stop reason: SURG

## 2023-08-03 RX ORDER — LIDOCAINE HYDROCHLORIDE 10 MG/ML
0.5 INJECTION, SOLUTION EPIDURAL; INFILTRATION; INTRACAUDAL; PERINEURAL ONCE AS NEEDED
Status: DISCONTINUED | OUTPATIENT
Start: 2023-08-03 | End: 2023-08-03 | Stop reason: HOSPADM

## 2023-08-03 RX ORDER — SODIUM CHLORIDE, SODIUM LACTATE, POTASSIUM CHLORIDE, CALCIUM CHLORIDE 600; 310; 30; 20 MG/100ML; MG/100ML; MG/100ML; MG/100ML
INJECTION, SOLUTION INTRAVENOUS CONTINUOUS PRN
Status: DISCONTINUED | OUTPATIENT
Start: 2023-08-03 | End: 2023-08-03 | Stop reason: SURG

## 2023-08-03 RX ORDER — DOCUSATE SODIUM 100 MG/1
100 CAPSULE, LIQUID FILLED ORAL 2 TIMES DAILY PRN
Status: DISCONTINUED | OUTPATIENT
Start: 2023-08-03 | End: 2023-08-04 | Stop reason: HOSPADM

## 2023-08-03 RX ORDER — SODIUM CHLORIDE 0.9 % (FLUSH) 0.9 %
3-10 SYRINGE (ML) INJECTION AS NEEDED
Status: DISCONTINUED | OUTPATIENT
Start: 2023-08-03 | End: 2023-08-03 | Stop reason: HOSPADM

## 2023-08-03 RX ORDER — LABETALOL HYDROCHLORIDE 5 MG/ML
5 INJECTION, SOLUTION INTRAVENOUS
Status: DISCONTINUED | OUTPATIENT
Start: 2023-08-03 | End: 2023-08-03 | Stop reason: HOSPADM

## 2023-08-03 RX ORDER — DROPERIDOL 2.5 MG/ML
0.62 INJECTION, SOLUTION INTRAMUSCULAR; INTRAVENOUS
Status: DISCONTINUED | OUTPATIENT
Start: 2023-08-03 | End: 2023-08-03 | Stop reason: HOSPADM

## 2023-08-03 RX ORDER — ONDANSETRON 4 MG/1
4 TABLET, FILM COATED ORAL EVERY 6 HOURS PRN
Status: DISCONTINUED | OUTPATIENT
Start: 2023-08-03 | End: 2023-08-04 | Stop reason: HOSPADM

## 2023-08-03 RX ORDER — HYDROMORPHONE HYDROCHLORIDE 1 MG/ML
0.5 INJECTION, SOLUTION INTRAMUSCULAR; INTRAVENOUS; SUBCUTANEOUS
Status: DISCONTINUED | OUTPATIENT
Start: 2023-08-03 | End: 2023-08-04 | Stop reason: HOSPADM

## 2023-08-03 RX ORDER — CEFAZOLIN SODIUM 2 G/100ML
INJECTION, SOLUTION INTRAVENOUS AS NEEDED
Status: DISCONTINUED | OUTPATIENT
Start: 2023-08-03 | End: 2023-08-03 | Stop reason: SURG

## 2023-08-03 RX ORDER — MIDAZOLAM HYDROCHLORIDE 1 MG/ML
1 INJECTION INTRAMUSCULAR; INTRAVENOUS
Status: DISCONTINUED | OUTPATIENT
Start: 2023-08-03 | End: 2023-08-03 | Stop reason: HOSPADM

## 2023-08-03 RX ORDER — FENTANYL CITRATE 50 UG/ML
INJECTION, SOLUTION INTRAMUSCULAR; INTRAVENOUS AS NEEDED
Status: DISCONTINUED | OUTPATIENT
Start: 2023-08-03 | End: 2023-08-03 | Stop reason: SURG

## 2023-08-03 RX ORDER — MEPERIDINE HYDROCHLORIDE 25 MG/ML
12.5 INJECTION INTRAMUSCULAR; INTRAVENOUS; SUBCUTANEOUS
Status: DISCONTINUED | OUTPATIENT
Start: 2023-08-03 | End: 2023-08-03 | Stop reason: HOSPADM

## 2023-08-03 RX ORDER — SUCCINYLCHOLINE/SOD CL,ISO/PF 200MG/10ML
SYRINGE (ML) INTRAVENOUS AS NEEDED
Status: DISCONTINUED | OUTPATIENT
Start: 2023-08-03 | End: 2023-08-03 | Stop reason: SURG

## 2023-08-03 RX ORDER — ROCURONIUM BROMIDE 10 MG/ML
INJECTION, SOLUTION INTRAVENOUS AS NEEDED
Status: DISCONTINUED | OUTPATIENT
Start: 2023-08-03 | End: 2023-08-03 | Stop reason: SURG

## 2023-08-03 RX ORDER — DEXAMETHASONE SODIUM PHOSPHATE 4 MG/ML
INJECTION, SOLUTION INTRA-ARTICULAR; INTRALESIONAL; INTRAMUSCULAR; INTRAVENOUS; SOFT TISSUE AS NEEDED
Status: DISCONTINUED | OUTPATIENT
Start: 2023-08-03 | End: 2023-08-03 | Stop reason: SURG

## 2023-08-03 RX ORDER — SODIUM CHLORIDE 0.9 % (FLUSH) 0.9 %
3 SYRINGE (ML) INJECTION EVERY 12 HOURS SCHEDULED
Status: DISCONTINUED | OUTPATIENT
Start: 2023-08-03 | End: 2023-08-03 | Stop reason: HOSPADM

## 2023-08-03 RX ORDER — HYDRALAZINE HYDROCHLORIDE 20 MG/ML
5 INJECTION INTRAMUSCULAR; INTRAVENOUS
Status: DISCONTINUED | OUTPATIENT
Start: 2023-08-03 | End: 2023-08-03 | Stop reason: HOSPADM

## 2023-08-03 RX ORDER — CEFAZOLIN SODIUM 2 G/100ML
2000 INJECTION, SOLUTION INTRAVENOUS ONCE
Status: DISCONTINUED | OUTPATIENT
Start: 2023-08-03 | End: 2023-08-03 | Stop reason: HOSPADM

## 2023-08-03 RX ORDER — SODIUM CHLORIDE, SODIUM LACTATE, POTASSIUM CHLORIDE, CALCIUM CHLORIDE 600; 310; 30; 20 MG/100ML; MG/100ML; MG/100ML; MG/100ML
9 INJECTION, SOLUTION INTRAVENOUS CONTINUOUS
Status: DISCONTINUED | OUTPATIENT
Start: 2023-08-03 | End: 2023-08-03

## 2023-08-03 RX ORDER — LIDOCAINE HYDROCHLORIDE 10 MG/ML
0.2 INJECTION, SOLUTION INFILTRATION; PERINEURAL ONCE
Status: COMPLETED | OUTPATIENT
Start: 2023-08-03 | End: 2023-08-03

## 2023-08-03 RX ORDER — PROMETHAZINE HYDROCHLORIDE 25 MG/1
25 SUPPOSITORY RECTAL ONCE AS NEEDED
Status: DISCONTINUED | OUTPATIENT
Start: 2023-08-03 | End: 2023-08-03 | Stop reason: HOSPADM

## 2023-08-03 RX ORDER — FENTANYL CITRATE 50 UG/ML
INJECTION, SOLUTION INTRAMUSCULAR; INTRAVENOUS
Status: COMPLETED
Start: 2023-08-03 | End: 2023-08-03

## 2023-08-03 RX ORDER — HYDROMORPHONE HYDROCHLORIDE 1 MG/ML
0.5 INJECTION, SOLUTION INTRAMUSCULAR; INTRAVENOUS; SUBCUTANEOUS
Status: DISCONTINUED | OUTPATIENT
Start: 2023-08-03 | End: 2023-08-03 | Stop reason: HOSPADM

## 2023-08-03 RX ORDER — IPRATROPIUM BROMIDE AND ALBUTEROL SULFATE 2.5; .5 MG/3ML; MG/3ML
3 SOLUTION RESPIRATORY (INHALATION) ONCE AS NEEDED
Status: DISCONTINUED | OUTPATIENT
Start: 2023-08-03 | End: 2023-08-03 | Stop reason: HOSPADM

## 2023-08-03 RX ORDER — FAMOTIDINE 20 MG/1
20 TABLET, FILM COATED ORAL 2 TIMES DAILY
Status: DISCONTINUED | OUTPATIENT
Start: 2023-08-03 | End: 2023-08-04 | Stop reason: HOSPADM

## 2023-08-03 RX ORDER — HYDROCODONE BITARTRATE AND ACETAMINOPHEN 5; 325 MG/1; MG/1
1 TABLET ORAL ONCE AS NEEDED
Status: DISCONTINUED | OUTPATIENT
Start: 2023-08-03 | End: 2023-08-03 | Stop reason: HOSPADM

## 2023-08-03 RX ORDER — NALOXONE HCL 0.4 MG/ML
0.4 VIAL (ML) INJECTION AS NEEDED
Status: DISCONTINUED | OUTPATIENT
Start: 2023-08-03 | End: 2023-08-03 | Stop reason: HOSPADM

## 2023-08-03 RX ORDER — DROPERIDOL 2.5 MG/ML
0.62 INJECTION, SOLUTION INTRAMUSCULAR; INTRAVENOUS ONCE AS NEEDED
Status: DISCONTINUED | OUTPATIENT
Start: 2023-08-03 | End: 2023-08-03 | Stop reason: HOSPADM

## 2023-08-03 RX ORDER — HYDRALAZINE HYDROCHLORIDE 20 MG/ML
INJECTION INTRAMUSCULAR; INTRAVENOUS
Status: COMPLETED
Start: 2023-08-03 | End: 2023-08-03

## 2023-08-03 RX ORDER — MAGNESIUM HYDROXIDE 1200 MG/15ML
LIQUID ORAL AS NEEDED
Status: DISCONTINUED | OUTPATIENT
Start: 2023-08-03 | End: 2023-08-03 | Stop reason: HOSPADM

## 2023-08-03 RX ORDER — OXYCODONE HYDROCHLORIDE AND ACETAMINOPHEN 5; 325 MG/1; MG/1
1 TABLET ORAL EVERY 4 HOURS PRN
Status: DISCONTINUED | OUTPATIENT
Start: 2023-08-03 | End: 2023-08-04 | Stop reason: HOSPADM

## 2023-08-03 RX ADMIN — FAMOTIDINE 20 MG: 20 TABLET, FILM COATED ORAL at 17:07

## 2023-08-03 RX ADMIN — HYDROMORPHONE HYDROCHLORIDE 0.5 MG: 1 INJECTION, SOLUTION INTRAMUSCULAR; INTRAVENOUS; SUBCUTANEOUS at 17:11

## 2023-08-03 RX ADMIN — FAMOTIDINE 20 MG: 20 TABLET ORAL at 09:32

## 2023-08-03 RX ADMIN — HYDRALAZINE HYDROCHLORIDE 5 MG: 20 INJECTION INTRAMUSCULAR; INTRAVENOUS at 13:43

## 2023-08-03 RX ADMIN — FENTANYL CITRATE 100 MCG: 50 INJECTION, SOLUTION INTRAMUSCULAR; INTRAVENOUS at 10:57

## 2023-08-03 RX ADMIN — LIDOCAINE HYDROCHLORIDE 50 MG: 10 INJECTION, SOLUTION EPIDURAL; INFILTRATION; INTRACAUDAL; PERINEURAL at 10:57

## 2023-08-03 RX ADMIN — ROCURONIUM BROMIDE 5 MG: 10 SOLUTION INTRAVENOUS at 10:57

## 2023-08-03 RX ADMIN — SOTALOL HYDROCHLORIDE 40 MG: 80 TABLET ORAL at 17:07

## 2023-08-03 RX ADMIN — DEXAMETHASONE SODIUM PHOSPHATE 8 MG: 4 INJECTION, SOLUTION INTRAMUSCULAR; INTRAVENOUS at 11:05

## 2023-08-03 RX ADMIN — PROPOFOL 200 MG: 10 INJECTION, EMULSION INTRAVENOUS at 10:57

## 2023-08-03 RX ADMIN — Medication 120 MG: at 10:57

## 2023-08-03 RX ADMIN — OXYCODONE HYDROCHLORIDE AND ACETAMINOPHEN 1 TABLET: 5; 325 TABLET ORAL at 20:40

## 2023-08-03 RX ADMIN — EPHEDRINE SULFATE 10 MG: 50 INJECTION, SOLUTION INTRAVENOUS at 11:15

## 2023-08-03 RX ADMIN — GLYCOPYRROLATE 0.2 MCG: 0.2 INJECTION INTRAMUSCULAR; INTRAVENOUS at 11:44

## 2023-08-03 RX ADMIN — EPHEDRINE SULFATE 10 MG: 50 INJECTION, SOLUTION INTRAVENOUS at 11:37

## 2023-08-03 RX ADMIN — CEFAZOLIN SODIUM 3 G: 2 INJECTION, SOLUTION INTRAVENOUS at 11:04

## 2023-08-03 RX ADMIN — FENTANYL CITRATE 50 MCG: 50 INJECTION, SOLUTION INTRAMUSCULAR; INTRAVENOUS at 13:41

## 2023-08-03 RX ADMIN — SODIUM CHLORIDE, POTASSIUM CHLORIDE, SODIUM LACTATE AND CALCIUM CHLORIDE 9 ML/HR: 600; 310; 30; 20 INJECTION, SOLUTION INTRAVENOUS at 09:15

## 2023-08-03 RX ADMIN — SODIUM CHLORIDE, POTASSIUM CHLORIDE, SODIUM LACTATE AND CALCIUM CHLORIDE: 600; 310; 30; 20 INJECTION, SOLUTION INTRAVENOUS at 10:54

## 2023-08-03 RX ADMIN — ONDANSETRON 4 MG: 2 INJECTION INTRAMUSCULAR; INTRAVENOUS at 12:39

## 2023-08-03 RX ADMIN — LIDOCAINE HYDROCHLORIDE 0.2 ML: 10 INJECTION, SOLUTION EPIDURAL; INFILTRATION; INTRACAUDAL; PERINEURAL at 09:15

## 2023-08-03 RX ADMIN — PROPOFOL 50 MCG/KG/MIN: 10 INJECTION, EMULSION INTRAVENOUS at 11:01

## 2023-08-03 NOTE — H&P
Pre-Op H&P  Lefty Elizabeth Jr.  [id number]  1965      Chief complaint: Thyroid nodule      Subjective:  Patient is a 58 y.o.male presents for scheduled surgery by Dr. Ho. He anticipates a RIGHT THYROID LOBECTOMY  today. He is followed by endocrinology for toxic thyroid nodule. He has benign FNA. He reports significant fatigue. He reports occasional aspiration. He has new onset afib.      Review of Systems:  Constitutional-- No fever, chills or sweats. + fatigue.  CV-- No chest pain or syncope. +HTN, afib, palpitations +cardiac clearance   Resp-- No SOB, cough, hemoptysis  Skin--No rashes or lesions      Allergies: No Known Allergies      Home Meds:  Medications Prior to Admission   Medication Sig Dispense Refill Last Dose    Omega-3 Fatty Acids (Fish Oil Burp-Less) 1200 MG capsule Take  by mouth 2 (Two) Times a Day.   8/2/2023    sotalol (BETAPACE) 80 MG tablet Take 0.5 tablets by mouth 2 (Two) Times a Day. 1/2 tablet twice daily 90 tablet 2 8/2/2023 at 1600    lisinopril-hydrochlorothiazide (PRINZIDE,ZESTORETIC) 10-12.5 MG per tablet Daily As Needed.   More than a month    rivaroxaban (Xarelto) 20 MG tablet Take 1 tablet by mouth Daily. (Patient taking differently: Take 1 tablet by mouth Daily. PT TO HOLD (3-4) DAYS PRIOR TO PROCEDURE PER DR. HERMAN.) 90 tablet 2 7/31/2023    tamsulosin (FLOMAX) 0.4 MG capsule 24 hr capsule TAKE 1 CAPSULE BY MOUTH ONCE A DAY 30 MINUTES AFTER THE SAME MEAL EACH DAY FOR PROSTATE (take AT bedtime)   8/1/2023         PMH:   Past Medical History:   Diagnosis Date    Arthritis     Atrial fibrillation     Chronic back pain     Dyspnea on exertion     Elevated cholesterol     Fatigue     GERD (gastroesophageal reflux disease)     Glucose intolerance (impaired glucose tolerance)     Heartburn     Hepatitis     age 15, doesn't know what kind    Hypercholesterolemia     Hypertension     Hypertriglyceridemia     Insomnia     Kidney stone     with abcess    Osteoarthritis of  "neck     /back    Pain in hip     Pain in neck     /back    Pain, joint, ankle and foot     Pain, knee     Peripheral edema     Testosterone deficiency 2017    Thyroid nodule 2020    Toxic multinodular goiter 03/30/2023     PSH:    Past Surgical History:   Procedure Laterality Date    CARDIOVASCULAR STRESS TEST  05/04/2006    @[facility]. - 7 Min, 89% THR. EF 51%. negative    CARDIOVASCULAR STRESS TEST  03/17/2017    EF 57%, 4 min 16 sec, no ischemia    COLONOSCOPY      ECHO - CONVERTED  03/17/2017    EF 60-65%, no AS, mild MR    ECHO - CONVERTED  12/28/2022    EF 65%. LA- 4.3. Trace-Mild MR. RVSP- 39 mmHG. AO-4.0    GASTRIC SLEEVE LAPAROSCOPIC  2010    NEPHRECTOMY Left     ROTATOR CUFF REPAIR Left        Immunization History:  Influenza: No  Pneumococcal: No  Tetanus: Unknown  Covid : No    Social History:   Tobacco:   Social History     Tobacco Use   Smoking Status Never    Passive exposure: Never   Smokeless Tobacco Never      Alcohol:     Social History     Substance and Sexual Activity   Alcohol Use No         Physical Exam:/98 (BP Location: Right arm, Patient Position: Sitting)   Pulse 60   Temp 96.8 øF (36 øC) (Temporal)   Resp 18   Ht 180.3 cm (71\")   Wt 122 kg (268 lb)   SpO2 99%   BMI 37.38 kg/mý       General Appearance:    Alert, cooperative, no distress, appears stated age   Head:    Normocephalic, without obvious abnormality, atraumatic   Lungs:     Clear to auscultation bilaterally, respirations unlabored    Heart:   Regular rate and rhythm, S1 and S2 normal    Abdomen:    Soft without tenderness   Extremities:   Extremities normal, atraumatic, no cyanosis or edema   Skin:   Skin color, texture, turgor normal, no rashes or lesions   Neurologic:   Grossly intact     Results Review:     LABS:  Lab Results   Component Value Date    WBC 7.56 07/27/2023    HGB 13.0 07/27/2023    HCT 41.8 07/27/2023    MCV 90.9 07/27/2023     07/27/2023    NEUTROABS 1.9 09/05/2017    GLUCOSE 118 (H) " 09/05/2017    BUN 17 09/05/2017    CREATININE 1.26 09/05/2017    EGFRIFNONA 64 09/05/2017    EGFRIFAFRI 74 09/05/2017     (H) 09/05/2017    K 3.7 07/27/2023     09/05/2017    CO2 25 09/05/2017    MG 1.9 09/05/2017    CALCIUM 9.3 09/05/2017    ALBUMIN 4.4 09/05/2017    AST 16 09/05/2017    ALT 9 09/05/2017    BILITOT 0.4 09/05/2017       RADIOLOGY:  Imaging Results (Last 72 Hours)       ** No results found for the last 72 hours. **            I reviewed the patient's new clinical results.    Cancer Staging (if applicable)  Cancer Patient: __ yes __no __unknown; If yes, clinical stage T:__ N:__M:__, stage group or __N/A      Impression: Toxic thyroid nodule       Plan: RIGHT THYROID LOBECTOMY       Bridgette Cifuentes, KIMO   8/3/2023   09:41 EDT

## 2023-08-03 NOTE — PLAN OF CARE
Goal Outcome Evaluation:  Plan of Care Reviewed With: patient, spouse        Progress: improving  Outcome Evaluation: VSS. RA. Pain controlled with PRN dilaudid x1. Ambulating in room. Tolerating CLD, will advance. Incision c/d/i. No further needs identified at this time.

## 2023-08-03 NOTE — OP NOTE
"General Surgery Operative Note    THYROIDECTOMY  Procedure Report    Patient Name:  Lefty Elizabeth Jr.  YOB: 1965  [id number]     Date of Surgery:  8/3/2023       Pre-op Diagnosis: Toxic thyroid nodule    Post-op Diagnosis: Toxic thyroid nodule      Procedure(s):  RIGHT THYROID LOBECTOMY    Surgeon: Carlos Uribe MD    Assistant: Earl Baltazar PA     Anesthesia: General         Estimated Blood Loss: minimal    Complications: None     Implants:    Implant Name Type Inv. Item Serial No.  Lot No. LRB No. Used Action   CLIP LIGAT VASC HORIZON TI MD KONSTANTIN 6CT - [id number] Implant CLIP LIGAT VASC HORIZON TI MD KONSTANTIN 6CT  [employer] [id number] Right 4 Implanted   CLIP LIGAT VASC HORIZON TI SM YEL 6CT - [id number] Implant CLIP LIGAT VASC HORIZON TI SM YEL 6CT  [employer] [id number] Right 4 Implanted   HEMOST ABS SURGICEL 4X8IN - [id number] Implant HEMOST ABS SURGICEL 4X8IN  [employer]  [employer] J [employer] J [id number] Right 1 Implanted       Specimen:          Specimens       ID Source Type Tests Collected By Collected At Frozen?    A Thyroid Tissue TISSUE PATHOLOGY EXAM   Carlos Uribe MD 8/3/23 1144 No    Description: Right thyroid lobe for permanent                Findings: Right thyroid nodule removed completely grossly     Indications: The patient is a 58-year-old male with a history of hyperthyroidism as well as a right thyroid nodule.  A thyroid scan was performed showing this to be a \"hot\" nodule.  Discussion made with the patient about various treatment options and the patient was agreeable to a right thyroid lobectomy for treatment.  Informed consent was obtained.    Description of Procedure:     After obtaining informed consent, the patient was taken to the operating room and placed in supine position. After appropriate DVT and antibiotic prophylaxis, general anesthesia was induced with placement of a NIM tube for nerve monitoring. The neck was prepped and draped in " standard sterile fashion.    An approximately 6 cm incision was made 2 fingerbreadths superior to the suprasternal notch transversely across the midline neck.  The skin was incised using a 15 blade.  The dermis and subcutaneous tissue were divided using Bovie electrocautery.  The platysma was dissected out and divided using Bovie electrocautery.  Subplatysmal flaps were created superiorly to the thyroid cartilage, inferiorly to the sternal notch, and laterally to the sternocleidomastoid muscles.  The strap muscles were divided in the midline at the median raphae.  Attention was paid to the right neck.  The strap muscles were dissected off the anterior and lateral surfaces of the thyroid lobe using Bovie electrocautery.  Babcocks were placed on the thyroid lobe and it was retracted anteriorly and medially.  Superior thyroid vessels were ligated with combination of clips and Harmonic.  The superior and inferior parathyroid glands were identified and dissected away from the thyroid lobe with blood supply left intact.  The recurrent laryngeal nerve was identified by visualization.  Soft tissues around this area were dissected using bipolar electrocautery and small vessels ligated with clips.  Once the thyroid was dissected away from the area of the nerve the remainder the thyroid lobe was dissected off the anterior surface of the trachea using bipolar cautery.  Inferior thyroid vessels were ligated with combination of clips and Harmonic. The thyroid was then transected at the level of the isthmus using the Harmonic device. The specimen was then removed from the body and inspected for parathyroid tissue, and none was noted. It was then sent to pathology as a permanent specimen.    Valsalva maneuver was performed by anesthesia to assess for any further bleeding and any further bleeding was controlled using placement of clips.  After hemostasis was obtained the right recurrent laryngeal nerve was grossly intact throughout  the entire visualized course.  Surgicel was placed in all areas of dissection.  The strap muscles were loosely reapproximated using interrupted 3-0 Vicryl.  The platysma was reapproximated using interrupted 3-0 Vicryl.  The skin was reapproximated using a running subcuticular 4-0 Monocryl.  A dry dressing was placed on top of this.  The patient awoke from anesthesia without complications and was taken to the PACU in stable condition.      Assistant: Earl Baltazar PA  was responsible for performing the following activities: Retraction, Suction, Suturing, Closing, and Placing Dressing and their skilled assistance was necessary for the success of this case.    Carlos Uribe MD     Date: 8/3/2023  Time: 12:49 EDT

## 2023-08-03 NOTE — BRIEF OP NOTE
THYROIDECTOMY  Progress Note    Lefty Elizabeth JrDarin  8/3/2023    Pre-op Diagnosis:   Toxic thyroid nodule       Post-Op Diagnosis Codes:     * Toxic thyroid nodule [E05.10]    Procedure/CPTr Codes:        Procedure(s):  RIGHT THYROID LOBECTOMY              Surgeon(s):  Carlos Uribe MD    Anesthesia: General    Staff:   Circulator: Aide Humphrey RN; Nicole Collins RN  Scrub Person: Chitra Oneil RN  Nursing Assistant: Ginna Garsia  Assistant: Earl Baltazar PA  Assistant: Earl Baltazar PA      Estimated Blood Loss: minimal    Urine Voided: * No values recorded between 8/3/2023 10:54 AM and 8/3/2023 12:41 PM *    Specimens:                Specimens       ID Source Type Tests Collected By Collected At Frozen?    A Thyroid Tissue TISSUE PATHOLOGY EXAM   Carlos Uribe MD 8/3/23 1144 No    Description: Right thyroid lobe for permanent                  Drains: * No LDAs found *    Findings: Right thyroid nodule removed completely grossly        Complications: None    Assistant: Earl Baltazar PA  was responsible for performing the following activities: Retraction, Suction, Suturing, Closing, and Placing Dressing and their skilled assistance was necessary for the success of this case.    Carlos Uribe MD     Date: 8/3/2023  Time: 12:48 EDT

## 2023-08-04 VITALS
HEART RATE: 56 BPM | DIASTOLIC BLOOD PRESSURE: 77 MMHG | OXYGEN SATURATION: 92 % | RESPIRATION RATE: 16 BRPM | SYSTOLIC BLOOD PRESSURE: 127 MMHG | BODY MASS INDEX: 37.52 KG/M2 | HEIGHT: 71 IN | TEMPERATURE: 98.1 F | WEIGHT: 268 LBS

## 2023-08-04 RX ORDER — OXYCODONE HYDROCHLORIDE AND ACETAMINOPHEN 5; 325 MG/1; MG/1
1 TABLET ORAL EVERY 4 HOURS PRN
Qty: 10 TABLET | Refills: 0 | Status: SHIPPED | OUTPATIENT
Start: 2023-08-04 | End: 2023-08-13

## 2023-08-04 RX ORDER — LEVOTHYROXINE SODIUM 0.05 MG/1
50 TABLET ORAL
Qty: 30 TABLET | Refills: 2 | Status: SHIPPED | OUTPATIENT
Start: 2023-08-05 | End: 2023-11-03

## 2023-08-04 RX ORDER — PSEUDOEPHEDRINE HCL 30 MG
100 TABLET ORAL 2 TIMES DAILY PRN
Qty: 30 CAPSULE | Refills: 0 | Status: SHIPPED | OUTPATIENT
Start: 2023-08-04

## 2023-08-04 RX ADMIN — SOTALOL HYDROCHLORIDE 40 MG: 80 TABLET ORAL at 08:54

## 2023-08-04 RX ADMIN — LEVOTHYROXINE SODIUM 50 MCG: 0.05 TABLET ORAL at 06:22

## 2023-08-04 RX ADMIN — FAMOTIDINE 20 MG: 20 TABLET, FILM COATED ORAL at 08:54

## 2023-08-04 RX ADMIN — OXYCODONE HYDROCHLORIDE AND ACETAMINOPHEN 1 TABLET: 5; 325 TABLET ORAL at 05:59

## 2023-08-04 NOTE — PLAN OF CARE
Goal Outcome Evaluation:              Outcome Evaluation: all d/c goals met. iv removed with tip intact. d/c instructions given, pt verbalized understanding. all questions answered. no concerns noted.

## 2023-08-04 NOTE — DISCHARGE SUMMARY
Patient Name:  Lefty Elizabeth Jr.  YOB: 1965  [id number]      Date of Discharge:  8/4/2023    Discharge Diagnosis:   Toxic thyroid nodule    Problem List:  Active Hospital Problems    Diagnosis  POA    **Toxic thyroid nodule [E05.10]  Yes      Resolved Hospital Problems   No resolved problems to display.       Presenting Problem/History of Present Illness  Toxic thyroid nodule [E05.10]      Hospital Course  Patient is a 58 y.o. male presented with a toxic thyroid nodule. On 8/3/2023 I performed a right thyroid lobectomy. On postoperative day one, pain was well controlled. No numbness/tingling in fingers/toes/lips. No nausea/vomiting. No fevers/chills. Voiding spontaneously. Ambulating appropriately. The patient was thus found to be safe for discharge to home.           Procedures Performed    Procedure(s):  RIGHT THYROID LOBECTOMY  -------------------       Consults:   Consults       No orders found for last 30 day(s).            Pertinent Test Results: N/A    Condition on Discharge:  Pain controlled with oral pain medication, tolerating diet, ambulating appropriately      Vital Signs  Temp:  [96.8 øF (36 øC)-97.9 øF (36.6 øC)] 97.6 øF (36.4 øC)  Heart Rate:  [52-80] 59  Resp:  [14-18] 16  BP: (112-181)/() 142/69    Discharge Disposition  Home or Self Care    Discharge Medications     Discharge Medications        New Medications        Instructions Start Date   docusate sodium 100 MG capsule   100 mg, Oral, 2 Times Daily PRN      levothyroxine 50 MCG tablet  Commonly known as: SYNTHROID, LEVOTHROID   50 mcg, Oral, Every Early Morning   Start Date: August 5, 2023     oxyCODONE-acetaminophen 5-325 MG per tablet  Commonly known as: PERCOCET   1 tablet, Oral, Every 4 Hours PRN             Continue These Medications        Instructions Start Date   Fish Oil Burp-Less 1200 MG capsule   Oral, 2 Times Daily      lisinopril-hydrochlorothiazide 10-12.5 MG per tablet  Commonly known as:  PRINZIDE,ZESTORETIC   Daily PRN      rivaroxaban 20 MG tablet  Commonly known as: Xarelto   20 mg, Oral, Daily      sotalol 80 MG tablet  Commonly known as: BETAPACE   40 mg, Oral, 2 Times Daily, 1/2 tablet twice daily      tamsulosin 0.4 MG capsule 24 hr capsule  Commonly known as: FLOMAX   TAKE 1 CAPSULE BY MOUTH ONCE A DAY 30 MINUTES AFTER THE SAME MEAL EACH DAY FOR PROSTATE (take AT bedtime)               Discharge Diet   Diet Instructions       Diet: Regular/House Diet; Regular Texture (IDDSI 7); Thin (IDDSI 0)      Discharge Diet: Regular/House Diet    Texture: Regular Texture (IDDSI 7)    Fluid Consistency: Thin (IDDSI 0)            Activity at Discharge  Activity Instructions       Discharge Activity      1) No driving until no longer taking narcotics.   2) Return to school / work in 1 week.  3) May shower. No tub baths. No swimming.   4) Do not lift / push / pull more than 15 lbs.            Follow-up Appointments  Future Appointments   Date Time Provider Department [place]   1/17/2024  9:15 AM Mae Napier APRN [facility]     Additional Instructions for the Follow-ups that You Need to Schedule       Discharge Follow-up with Specified Provider: Dr. Uribe; 2 Weeks   As directed      To: Dr. Uribe   Follow Up: 2 Weeks   Follow Up Details: Call [phone] to make an appointment        Notify Physician or Go To The ED For the Following Conditions   As directed      Fever >100.4, increased pain, rapid neck swelling, new redness/drainage from incision    Order Comments: Fever >100.4, increased pain, rapid neck swelling, new redness/drainage from incision                 Test Results Pending at Discharge  Pending Labs       Order Current Status    Tissue Pathology Exam In process            Time: Discharge 15 min    Carlos Uribe MD   08/04/23   07:01 EDT

## 2023-08-04 NOTE — PLAN OF CARE
VSS.  Dressing CDI.  Demonstrates use of I/S.  Pain well controlled.  Tolerating diet.  Ambulating.  Voiding.   Awaiting AM labs.

## 2023-08-07 LAB
CYTO UR: NORMAL
LAB AP CASE REPORT: NORMAL
LAB AP CLINICAL INFORMATION: NORMAL
PATH REPORT.FINAL DX SPEC: NORMAL
PATH REPORT.GROSS SPEC: NORMAL

## 2023-11-16 ENCOUNTER — TELEPHONE (OUTPATIENT)
Dept: CARDIOLOGY | Facility: CLINIC | Age: 58
End: 2023-11-16
Payer: COMMERCIAL

## 2023-11-16 NOTE — TELEPHONE ENCOUNTER
REQUEST FOR CARDIAC CLEARANCE    Caller name: Lefty PRIEST Glenn Vera     Phone Number: [phone]    Surgeon's name: DR. NASSAR    Type of planned surgery: KNEE    Date of planned surgery: 11-27-23    Type of anesthesia:     Have you been experiencing chest pain or shortness of breath? NO    Is your doctor requesting for you to stop any of your medications prior to your surgery? YES, XARELTO FOR 3 PRIOR TO SURGERY.    Where should we fax the clearance to?    [facility] [phone] SURGERY SCHEDULER  PHONE # [phone]

## 2023-11-16 NOTE — TELEPHONE ENCOUNTER
Received fax from Dr. Harvey for cardiac clearance for patient to have a left total knee arthroplasty. Patient is on Xarelto and they are requesting to hold for 3 days prior. According to our records, I do not see where patient has had any stenting. Patient has a history of PAF.          Fax [phone]

## 2024-01-17 ENCOUNTER — OFFICE VISIT (OUTPATIENT)
Dept: CARDIOLOGY | Facility: CLINIC | Age: 59
End: 2024-01-17
Payer: COMMERCIAL

## 2024-01-17 VITALS
SYSTOLIC BLOOD PRESSURE: 122 MMHG | WEIGHT: 284 LBS | DIASTOLIC BLOOD PRESSURE: 64 MMHG | HEIGHT: 71 IN | BODY MASS INDEX: 39.76 KG/M2 | HEART RATE: 71 BPM

## 2024-01-17 DIAGNOSIS — I48.0 PAF (PAROXYSMAL ATRIAL FIBRILLATION): Primary | ICD-10-CM

## 2024-01-17 DIAGNOSIS — E66.9 OBESITY (BMI 30-39.9): ICD-10-CM

## 2024-01-17 DIAGNOSIS — I10 PRIMARY HYPERTENSION: ICD-10-CM

## 2024-01-17 DIAGNOSIS — E78.00 HYPERCHOLESTEREMIA: ICD-10-CM

## 2024-01-17 PROCEDURE — 93000 ELECTROCARDIOGRAM COMPLETE: CPT | Performed by: NURSE PRACTITIONER

## 2024-01-17 PROCEDURE — 99214 OFFICE O/P EST MOD 30 MIN: CPT | Performed by: NURSE PRACTITIONER

## 2024-01-17 RX ORDER — LISINOPRIL AND HYDROCHLOROTHIAZIDE 12.5; 1 MG/1; MG/1
1 TABLET ORAL DAILY PRN
Qty: 90 TABLET | Refills: 2 | Status: SHIPPED | OUTPATIENT
Start: 2024-01-17

## 2024-01-17 RX ORDER — SOTALOL HYDROCHLORIDE 80 MG/1
40 TABLET ORAL 2 TIMES DAILY
Qty: 90 TABLET | Refills: 2 | Status: SHIPPED | OUTPATIENT
Start: 2024-01-17

## 2024-01-17 NOTE — PROGRESS NOTES
Chief Complaint   Patient presents with    Follow-up     Pt is here for cardiac follow up.  Pt denies CP, SOB, dizziness or palpitations.  Pt does not take a daily ASA.      Med Refill     Pt request 90 day refills to be sent to [facility] in [place].  Medications were verified by the pt.      Lab Work     Pt states their last labs were 11/24/23 with [facility].         Cardiac Complaints  none      Subjective   Lefty Elizabeth Jr. is a 59 y.o. male with HTN, hyperlipidemia, metablic syndrome, and PAF (diagnosed in 2022). At the time of diagnosis of PAF, he was started on sotalol and xarelto. Cardiac workup after diagnosis showed LV function normal with no sig valve concerns, overnight oximetry normal. Patient then underwent right thyroid lobectomy in July 2023 followed by left knee arthroscopy in November.    Patient comes today for follow up and denies any new concerns. CP, SOA, dizziness, palpitations, or syncope noted. Labs 11/2023 at [facility], no current available. Refills requested.         Cardiac History  Past Surgical History:   Procedure Laterality Date    CARDIOVASCULAR STRESS TEST  05/04/2006    @[facility]. - 7 Min, 89% THR. EF 51%. negative    CARDIOVASCULAR STRESS TEST  03/17/2017    EF 57%, 4 min 16 sec, no ischemia    COLONOSCOPY      ECHO - CONVERTED  03/17/2017    EF 60-65%, no AS, mild MR    ECHO - CONVERTED  12/28/2022    EF 65%. LA- 4.3. Trace-Mild MR. RVSP- 39 mmHG. AO-4.0    GASTRIC SLEEVE LAPAROSCOPIC  2010    NEPHRECTOMY Left     REPLACEMENT TOTAL KNEE Left 11/27/2023    ROTATOR CUFF REPAIR Left     THYROIDECTOMY Right 08/03/2023    Procedure: THYROID LOBECTOMY RIGHT;  Surgeon: Carlos Uribe MD;  Location: [facility];  Service: General;  Laterality: Right;       Current Outpatient Medications   Medication Sig Dispense Refill    lisinopril-hydrochlorothiazide (PRINZIDE,ZESTORETIC) 10-12.5 MG per tablet Take 1 tablet by mouth Daily As Needed (for elevated BP). 90 tablet 2    Omega-3 Fatty  Acids (Fish Oil Burp-Less) 1200 MG capsule Take  by mouth 2 (Two) Times a Day.      rivaroxaban (Xarelto) 20 MG tablet Take 1 tablet by mouth Daily. 90 tablet 2    sotalol (BETAPACE) 80 MG tablet Take 0.5 tablets by mouth 2 (Two) Times a Day. 1/2 tablet twice daily 90 tablet 2    tamsulosin (FLOMAX) 0.4 MG capsule 24 hr capsule TAKE 1 CAPSULE BY MOUTH ONCE A DAY 30 MINUTES AFTER THE SAME MEAL EACH DAY FOR PROSTATE (take AT bedtime)       No current facility-administered medications for this visit.       Patient has no known allergies.    Past Medical History:   Diagnosis Date    Arthritis     Atrial fibrillation     Chronic back pain     Dyspnea on exertion     Elevated cholesterol     Fatigue     GERD (gastroesophageal reflux disease)     Glucose intolerance (impaired glucose tolerance)     Heartburn     Hepatitis     age 15, doesn't know what kind    Hypercholesterolemia     Hypertension     Hypertriglyceridemia     Insomnia     Kidney stone     with abcess    Murmur, cardiac 12/21/2022    Osteoarthritis of neck     /back    Pain in hip     Pain in neck     /back    Pain, joint, ankle and foot     Pain, knee     Peripheral edema     Testosterone deficiency 2017    Thyroid nodule 2020    Toxic multinodular goiter 03/30/2023       Social History     Socioeconomic History    Marital status:    Tobacco Use    Smoking status: Never     Passive exposure: Never    Smokeless tobacco: Never   Vaping Use    Vaping Use: Never used   Substance and Sexual Activity    Alcohol use: No    Drug use: No    Sexual activity: Defer       Family History   Problem Relation Age of Onset    Lupus Mother     Hypertension Mother     Cancer Mother         HBP lupus    Stroke Mother     Heart failure Father     Heart disease Father         valve problems due to rheumatic fever    Hypertension Father     Obesity Father     Cancer Father         congestive heart failure    Cancer Brother         Bi Polar    Cancer Sister         HBP  "      Review of Systems   Constitutional: Negative for malaise/fatigue and night sweats.   Cardiovascular:  Negative for chest pain, claudication, dyspnea on exertion, irregular heartbeat, leg swelling, near-syncope, orthopnea, palpitations and syncope.   Respiratory:  Negative for cough, shortness of breath and wheezing.    Musculoskeletal:  Positive for joint pain and stiffness. Negative for back pain.   Gastrointestinal:  Negative for anorexia, heartburn, nausea and vomiting.   Genitourinary:  Negative for dysuria, hematuria, hesitancy and nocturia.   Neurological:  Negative for dizziness, light-headedness and loss of balance.   Psychiatric/Behavioral:  Negative for depression and memory loss. The patient is not nervous/anxious.            Objective     /64 (BP Location: Left arm, Patient Position: Sitting)   Pulse 71   Ht 180.3 cm (71\")   Wt 129 kg (284 lb)   BMI 39.61 kg/m²     Constitutional:       Appearance: Not in distress.   Eyes:      Pupils: Pupils are equal, round, and reactive to light.   HENT:      Nose: Nose normal.   Pulmonary:      Effort: Pulmonary effort is normal.      Breath sounds: Normal breath sounds.   Cardiovascular:      PMI at left midclavicular line. Normal rate. Regular rhythm.      Murmurs: There is a systolic murmur.   Abdominal:      Palpations: Abdomen is soft.   Musculoskeletal: Normal range of motion.      Cervical back: Normal range of motion and neck supple. Skin:     General: Skin is warm and dry.   Neurological:      Mental Status: Alert.           ECG 12 Lead    Date/Time: 1/17/2024 1:28 PM  Performed by: Mae Napier APRN    Authorized by: Mae Napier APRN  Comparison: compared with previous ECG from 7/3/2023  Similar to previous ECG  Rhythm: sinus rhythm  Ectopy: unifocal PVCs  BPM: 71  Conduction: incomplete right bundle branch block    Clinical impression: abnormal EKG  Comments: Normal QT               Diagnoses and all orders for this visit:    1. " PAF (paroxysmal atrial fibrillation) (Primary)  -     ECG 12 Lead    2. Primary hypertension    3. Hypercholesteremia    4. Obesity (BMI 30-39.9)    Other orders  -     lisinopril-hydrochlorothiazide (PRINZIDE,ZESTORETIC) 10-12.5 MG per tablet; Take 1 tablet by mouth Daily As Needed (for elevated BP).  Dispense: 90 tablet; Refill: 2  -     rivaroxaban (Xarelto) 20 MG tablet; Take 1 tablet by mouth Daily.  Dispense: 90 tablet; Refill: 2  -     sotalol (BETAPACE) 80 MG tablet; Take 0.5 tablets by mouth 2 (Two) Times a Day. 1/2 tablet twice daily  Dispense: 90 tablet; Refill: 2             PAF: EKG done for sotalol and xarelto therapy showed a sinus with one PVC and normal QT. He will continue on same. Bleed and bruise denied on current xarelto therapy. Samples provided.     Hyperlipidemia: Using fish oil therapy. Limited carb diet urged. FLP with your office. Can we have for review?     HTN: BP is stable, no additional medication will be added. Limited sodium urged.     Refills per request.     BMI noted at 39.61, good cardiac diet with limited carbs, calories, and walking as tolerated advised.      6 month follow up recommended, or sooner if needed.           Problems Addressed this Visit          Cardiac and Vasculature    Hypercholesteremia    PAF (paroxysmal atrial fibrillation) - Primary    Relevant Medications    sotalol (BETAPACE) 80 MG tablet    Other Relevant Orders    ECG 12 Lead       Endocrine and Metabolic    Morbid obesity with BMI of 40.0-44.9, adult     Other Visit Diagnoses       Primary hypertension        Relevant Medications    lisinopril-hydrochlorothiazide (PRINZIDE,ZESTORETIC) 10-12.5 MG per tablet    sotalol (BETAPACE) 80 MG tablet          Diagnoses         Codes Comments    PAF (paroxysmal atrial fibrillation)    -  Primary ICD-10-CM: I48.0  ICD-9-CM: 427.31     Primary hypertension     ICD-10-CM: I10  ICD-9-CM: 401.9     Hypercholesteremia     ICD-10-CM: E78.00  ICD-9-CM: 272.0     Obesity  (BMI 30-39.9)     ICD-10-CM: E66.9  ICD-9-CM: 278.00                     Electronically signed by Mae Napier, APRN January 17, 2024 15:46 EST

## 2024-01-31 ENCOUNTER — TELEPHONE (OUTPATIENT)
Dept: CARDIOLOGY | Facility: CLINIC | Age: 59
End: 2024-01-31
Payer: COMMERCIAL

## 2024-01-31 DIAGNOSIS — E05.10 TOXIC THYROID NODULE: ICD-10-CM

## 2024-01-31 DIAGNOSIS — I48.0 PAF (PAROXYSMAL ATRIAL FIBRILLATION): ICD-10-CM

## 2024-01-31 DIAGNOSIS — R55 SYNCOPE AND COLLAPSE: ICD-10-CM

## 2024-01-31 DIAGNOSIS — R42 DIZZINESS: Primary | ICD-10-CM

## 2024-01-31 DIAGNOSIS — I20.89 ANGINAL EQUIVALENT: ICD-10-CM

## 2024-01-31 NOTE — TELEPHONE ENCOUNTER
Workup will be put in with stress, echo, and carotid. That BP is okay, I don't know if it may have been orthostatic. If stays low, cut zestoretic in half. Good water intake urged.

## 2024-01-31 NOTE — TELEPHONE ENCOUNTER
Caller: Lefty Elizabeth Jr.     Relationship: SELF     Best call back number: [phone]    What is your medical concern? WENT TO ED LAST NIGHT FOR LOW PRESSURE AND HAD NEARLY PASSED OUT WHILE ENJOYING COMPANY.   READINGS FROM HOSPITAL: 112/79, USUALLY /80S. HE IS WONDERING IF HE NEEDS TO GET AM ECHO DONE. WITH SOME INSTANCES OF DIZZINESS AND SOB ON MINOR EXERTION. STATES THEY DID BLOOD WORK AND IT CAME BACK FINE. HE WENT [facility]     How long has this issue been going on? DIZZINESS AND SOB HAS BEEN GOING FOR 4 DAYS    Is your provider already aware of this issue? N/A    Have you been treated for this issue? N/A

## 2024-01-31 NOTE — TELEPHONE ENCOUNTER
Pt aware of recommendations and advised to expect a call from scheduling. Understanding voiced. Pt states only takes the zestoretic if BP elevated, maybe once a month if that. Will keep himself well hydrated.

## 2024-02-15 ENCOUNTER — HOSPITAL ENCOUNTER (OUTPATIENT)
Dept: CARDIOLOGY | Facility: HOSPITAL | Age: 59
Discharge: HOME | End: 2024-02-15
Payer: COMMERCIAL

## 2024-02-15 ENCOUNTER — LAB (OUTPATIENT)
Dept: LAB | Facility: HOSPITAL | Age: 59
End: 2024-02-15
Payer: COMMERCIAL

## 2024-02-15 VITALS — BODY MASS INDEX: 39.81 KG/M2 | HEIGHT: 71 IN | WEIGHT: 284.39 LBS

## 2024-02-15 DIAGNOSIS — I48.0 PAF (PAROXYSMAL ATRIAL FIBRILLATION): ICD-10-CM

## 2024-02-15 DIAGNOSIS — I20.89 ANGINAL EQUIVALENT: ICD-10-CM

## 2024-02-15 DIAGNOSIS — E05.10 TOXIC THYROID NODULE: ICD-10-CM

## 2024-02-15 DIAGNOSIS — R42 DIZZINESS: ICD-10-CM

## 2024-02-15 DIAGNOSIS — R55 SYNCOPE AND COLLAPSE: ICD-10-CM

## 2024-02-15 LAB
ALBUMIN SERPL-MCNC: 3.9 G/DL (ref 3.5–5.2)
ALBUMIN/GLOB SERPL: 1.4 G/DL
ALP SERPL-CCNC: 92 U/L (ref 39–117)
ALT SERPL W P-5'-P-CCNC: 8 U/L (ref 1–41)
ANION GAP SERPL CALCULATED.3IONS-SCNC: 13.3 MMOL/L (ref 5–15)
AORTIC DIMENSIONLESS INDEX: 0.86 (DI)
AST SERPL-CCNC: 12 U/L (ref 1–40)
BH CV ECHO MEAS - ACS: 2.31 CM
BH CV ECHO MEAS - AO MAX PG: 5.2 MMHG
BH CV ECHO MEAS - AO MEAN PG: 2.41 MMHG
BH CV ECHO MEAS - AO ROOT DIAM: 3.9 CM
BH CV ECHO MEAS - AO V2 MAX: 114 CM/SEC
BH CV ECHO MEAS - AO V2 VTI: 24.5 CM
BH CV ECHO MEAS - EDV(CUBED): 162.7 ML
BH CV ECHO MEAS - EF(MOD-BP): 55 %
BH CV ECHO MEAS - EF_3D-VOL: 51 %
BH CV ECHO MEAS - ESV(CUBED): 59.1 ML
BH CV ECHO MEAS - FS: 28.7 %
BH CV ECHO MEAS - IVS/LVPW: 1.02 CM
BH CV ECHO MEAS - IVSD: 1.37 CM
BH CV ECHO MEAS - LA DIMENSION: 4.2 CM
BH CV ECHO MEAS - LAT PEAK E' VEL: 4.3 CM/SEC
BH CV ECHO MEAS - LV MASS(C)D: 320.4 GRAMS
BH CV ECHO MEAS - LV MAX PG: 3.4 MMHG
BH CV ECHO MEAS - LV MEAN PG: 1.53 MMHG
BH CV ECHO MEAS - LV V1 MAX: 92.7 CM/SEC
BH CV ECHO MEAS - LV V1 VTI: 21.1 CM
BH CV ECHO MEAS - LVIDD: 5.5 CM
BH CV ECHO MEAS - LVIDS: 3.9 CM
BH CV ECHO MEAS - LVPWD: 1.35 CM
BH CV ECHO MEAS - MED PEAK E' VEL: 6.1 CM/SEC
BH CV ECHO MEAS - MV A MAX VEL: 50.6 CM/SEC
BH CV ECHO MEAS - MV DEC SLOPE: 308.2 CM/SEC2
BH CV ECHO MEAS - MV DEC TIME: 0.25 SEC
BH CV ECHO MEAS - MV E MAX VEL: 48.8 CM/SEC
BH CV ECHO MEAS - MV E/A: 0.96
BH CV ECHO MEAS - MV MAX PG: 1.63 MMHG
BH CV ECHO MEAS - MV MEAN PG: 0.79 MMHG
BH CV ECHO MEAS - MV P1/2T: 58.8 MSEC
BH CV ECHO MEAS - MV V2 VTI: 20.1 CM
BH CV ECHO MEAS - MVA(P1/2T): 3.7 CM2
BH CV ECHO MEAS - PA V2 MAX: 101.4 CM/SEC
BH CV ECHO MEAS - RAP SYSTOLE: 8 MMHG
BH CV ECHO MEAS - RV MAX PG: 2.4 MMHG
BH CV ECHO MEAS - RV V1 MAX: 77.5 CM/SEC
BH CV ECHO MEAS - RV V1 VTI: 14.7 CM
BH CV ECHO MEAS - RVDD: 3.1 CM
BH CV ECHO MEAS - RVSP: 31.9 MMHG
BH CV ECHO MEAS - TAPSE (>1.6): 2.7 CM
BH CV ECHO MEAS - TR MAX PG: 23.9 MMHG
BH CV ECHO MEAS - TR MAX VEL: 244.4 CM/SEC
BH CV ECHO MEASUREMENTS AVERAGE E/E' RATIO: 9.38
BH CV REST NUCLEAR ISOTOPE DOSE: 10 MCI
BH CV STRESS COMMENTS STAGE 1: NORMAL
BH CV STRESS DOSE REGADENOSON STAGE 1: 0.4
BH CV STRESS DURATION MIN STAGE 1: 0
BH CV STRESS DURATION SEC STAGE 1: 10
BH CV STRESS NUCLEAR ISOTOPE DOSE: 30 MCI
BH CV STRESS PROTOCOL 1: NORMAL
BH CV STRESS RECOVERY BP: NORMAL MMHG
BH CV STRESS RECOVERY HR: 75 BPM
BH CV STRESS STAGE 1: 1
BH CV XLRA - TDI S': 14 CM/SEC
BILIRUB SERPL-MCNC: 0.5 MG/DL (ref 0–1.2)
BUN SERPL-MCNC: 15 MG/DL (ref 8–23)
BUN/CREAT SERPL: 12.8 (ref 7–25)
CALCIUM SPEC-SCNC: 9.4 MG/DL (ref 8.6–10.5)
CHLORIDE SERPL-SCNC: 103 MMOL/L (ref 98–107)
CO2 SERPL-SCNC: 23.7 MMOL/L (ref 22–29)
CREAT SERPL-MCNC: 1.17 MG/DL (ref 0.76–1.27)
DEPRECATED RDW RBC AUTO: 49.7 FL (ref 37–54)
EGFRCR SERPLBLD CKD-EPI 2021: 70.9 ML/MIN/1.73
ERYTHROCYTE [DISTWIDTH] IN BLOOD BY AUTOMATED COUNT: 13.8 % (ref 12.3–15.4)
GLOBULIN UR ELPH-MCNC: 2.7 GM/DL
GLUCOSE SERPL-MCNC: 118 MG/DL (ref 65–99)
HCT VFR BLD AUTO: 42.8 % (ref 37.5–51)
HGB BLD-MCNC: 12.9 G/DL (ref 13–17.7)
LV EF NUC BP: 51 %
MAXIMAL PREDICTED HEART RATE: 159 BPM
MCH RBC QN AUTO: 29.3 PG (ref 26.6–33)
MCHC RBC AUTO-ENTMCNC: 30.1 G/DL (ref 31.5–35.7)
MCV RBC AUTO: 97.3 FL (ref 79–97)
PERCENT MAX PREDICTED HR: 49.06 %
PLATELET # BLD AUTO: 218 10*3/MM3 (ref 140–450)
PMV BLD AUTO: 11.2 FL (ref 6–12)
POTASSIUM SERPL-SCNC: 4 MMOL/L (ref 3.5–5.2)
PROT SERPL-MCNC: 6.6 G/DL (ref 6–8.5)
RBC # BLD AUTO: 4.4 10*6/MM3 (ref 4.14–5.8)
SINUS: 3.8 CM
SODIUM SERPL-SCNC: 140 MMOL/L (ref 136–145)
STRESS BASELINE BP: NORMAL MMHG
STRESS BASELINE HR: 62 BPM
STRESS PERCENT HR: 58 %
STRESS POST PEAK BP: NORMAL MMHG
STRESS POST PEAK HR: 78 BPM
STRESS TARGET HR: 135 BPM
TSH SERPL DL<=0.05 MIU/L-ACNC: 1.72 UIU/ML (ref 0.27–4.2)
WBC NRBC COR # BLD AUTO: 4.09 10*3/MM3 (ref 3.4–10.8)

## 2024-02-15 PROCEDURE — 0 TECHNETIUM SESTAMIBI: Performed by: INTERNAL MEDICINE

## 2024-02-15 PROCEDURE — 80050 GENERAL HEALTH PANEL: CPT | Performed by: NURSE PRACTITIONER

## 2024-02-15 PROCEDURE — 93880 EXTRACRANIAL BILAT STUDY: CPT

## 2024-02-15 PROCEDURE — 93880 EXTRACRANIAL BILAT STUDY: CPT | Performed by: RADIOLOGY

## 2024-02-15 PROCEDURE — 93017 CV STRESS TEST TRACING ONLY: CPT

## 2024-02-15 PROCEDURE — 93306 TTE W/DOPPLER COMPLETE: CPT

## 2024-02-15 PROCEDURE — 25010000002 REGADENOSON 0.4 MG/5ML SOLUTION: Performed by: NURSE PRACTITIONER

## 2024-02-15 PROCEDURE — 78452 HT MUSCLE IMAGE SPECT MULT: CPT

## 2024-02-15 PROCEDURE — A9500 TC99M SESTAMIBI: HCPCS | Performed by: INTERNAL MEDICINE

## 2024-02-15 RX ORDER — REGADENOSON 0.08 MG/ML
0.4 INJECTION, SOLUTION INTRAVENOUS
Status: COMPLETED | OUTPATIENT
Start: 2024-02-15 | End: 2024-02-15

## 2024-02-15 RX ADMIN — TECHNETIUM TC 99M SESTAMIBI 1 DOSE: 1 INJECTION INTRAVENOUS at 08:38

## 2024-02-15 RX ADMIN — REGADENOSON 0.4 MG: 0.08 INJECTION, SOLUTION INTRAVENOUS at 09:55

## 2024-02-15 RX ADMIN — TECHNETIUM TC 99M SESTAMIBI 1 DOSE: 1 INJECTION INTRAVENOUS at 10:08

## 2024-07-18 ENCOUNTER — OFFICE VISIT (OUTPATIENT)
Dept: CARDIOLOGY | Facility: CLINIC | Age: 59
End: 2024-07-18
Payer: COMMERCIAL

## 2024-07-18 VITALS
SYSTOLIC BLOOD PRESSURE: 122 MMHG | HEIGHT: 71 IN | DIASTOLIC BLOOD PRESSURE: 78 MMHG | WEIGHT: 273.8 LBS | BODY MASS INDEX: 38.33 KG/M2 | HEART RATE: 48 BPM

## 2024-07-18 DIAGNOSIS — E66.01 SEVERE OBESITY (BMI 35.0-39.9) WITH COMORBIDITY: ICD-10-CM

## 2024-07-18 DIAGNOSIS — I10 PRIMARY HYPERTENSION: ICD-10-CM

## 2024-07-18 DIAGNOSIS — E78.00 HYPERCHOLESTEREMIA: ICD-10-CM

## 2024-07-18 DIAGNOSIS — I48.0 PAF (PAROXYSMAL ATRIAL FIBRILLATION): ICD-10-CM

## 2024-07-18 DIAGNOSIS — R00.1 BRADYCARDIA, SINUS: Primary | ICD-10-CM

## 2024-07-18 RX ORDER — SOTALOL HYDROCHLORIDE 80 MG/1
TABLET ORAL
Qty: 90 TABLET | Refills: 2 | Status: SHIPPED | OUTPATIENT
Start: 2024-07-18

## 2024-07-18 RX ORDER — LISINOPRIL AND HYDROCHLOROTHIAZIDE 12.5; 1 MG/1; MG/1
1 TABLET ORAL DAILY PRN
Qty: 90 TABLET | Refills: 2 | Status: SHIPPED | OUTPATIENT
Start: 2024-07-18

## 2024-07-18 NOTE — PROGRESS NOTES
Chief Complaint   Patient presents with    Follow-up     Cardiac management    Atrial Fibrillation     Patient reports  he does have some dizziness at times ,  he feels like he loses his balance  .  Has no c/o palpitations or tachycardia    LABS     February 2024 results in chart    Med Refill     Needs new prescriptions for sotalol and lisinopril 90 day supply to [facility]  Needs [facility]   [facility]        Cardiac Complaints  Dizziness      Subjective   [place] [facility] Elizabeth Jr. is a 59 y.o. male with HTN, hyperlipidemia, metablic syndrome, and PAF (diagnosed in 2022). At the time of diagnosis of PAF, he was started on sotalol and xarelto. Cardiac workup after diagnosis showed LV function normal with no sig valve concerns, overnight oximetry normal. Patient then underwent right thyroid lobectomy in July 2023 followed by left knee arthroscopy in November. He then called with pre-syncope in Jan 2024 and cardiac workup urged. Stress showed HTN response with apical infarct and ischemia vs infarct, home meds continued. Carotid US 1/31/2024 showed only mild plaque without occlusion.    He comes today for follow up and admits to dizziness at time, sometimes losing his balance, falls denied. Palpitations, tachycardia, SOA, syncope denied. Labs with PCP, but checked in Feb after hospital and showed: BUN 15, Creatinine 1.17, Na 140, K 4.0, GFR 70.9, TSH 1.720, HH 12.9/42.8. Refills requested. Patient reports most recent labs with PCP 3 weeks ago showed renal dysfunction, has been referred to Dr. Ag in regards.            Cardiac History  Past Surgical History:   Procedure Laterality Date    CARDIOVASCULAR STRESS TEST  05/04/2006    @[facility]. - 7 Min, 89% THR. EF 51%. negative    CARDIOVASCULAR STRESS TEST  03/17/2017    EF 57%, 4 min 16 sec, no ischemia    CARDIOVASCULAR STRESS TEST  02/15/2024    Lexiscan- EF 51%. 140/106.  Apical Infarct with Ischemia Vs Thinning    COLONOSCOPY      ECHO  - CONVERTED  03/17/2017    EF 60-65%, no AS, mild MR    ECHO - CONVERTED  12/28/2022    EF 65%. LA- 4.3. Trace-Mild MR. RVSP- 39 mmHG. AO-4.0    ECHO - CONVERTED  02/15/2023    TLS. EF 55%. LA- 4.2. Trace-Mild MR. AO- 3.9. RVSP- 32 mmHg    GASTRIC SLEEVE LAPAROSCOPIC  2010    NEPHRECTOMY Left     REPLACEMENT TOTAL KNEE Left 11/27/2023    ROTATOR CUFF REPAIR Left     THYROIDECTOMY Right 08/03/2023    Procedure: THYROID LOBECTOMY RIGHT;  Surgeon: Carlos Uribe MD;  Location: [facility];  Service: General;  Laterality: Right;    US CAROTID UNILATERAL  01/30/2024    No sig stenosis bilaterally       Current Outpatient Medications   Medication Sig Dispense Refill    lisinopril-hydrochlorothiazide (PRINZIDE,ZESTORETIC) 10-12.5 MG per tablet Take 1 tablet by mouth Daily As Needed (for elevated BP). 90 tablet 2    Omega-3 Fatty Acids (Fish Oil Burp-Less) 1200 MG capsule Take  by mouth 2 (Two) Times a Day.      rivaroxaban (Xarelto) 20 MG tablet Take 1 tablet by mouth Daily. 90 tablet 2    sotalol (BETAPACE) 80 MG tablet 1/2 to 1 tablet daily 90 tablet 2     No current facility-administered medications for this visit.       Patient has no known allergies.    Past Medical History:   Diagnosis Date    Arthritis     Atrial fibrillation     Chronic back pain     Dyspnea on exertion     Elevated cholesterol     Fatigue     GERD (gastroesophageal reflux disease)     Glucose intolerance (impaired glucose tolerance)     Heartburn     Hepatitis     age 15, doesn't know what kind    Hypercholesterolemia     Hypertension     Hypertriglyceridemia     Insomnia     Kidney stone     with abcess    Murmur, cardiac 12/21/2022    Osteoarthritis of neck     /back    Pain in hip     Pain in neck     /back    Pain, joint, ankle and foot     Pain, knee     Peripheral edema     Testosterone deficiency 2017    Thyroid nodule 2020    Toxic multinodular goiter 03/30/2023       Social History     Socioeconomic History    Marital status:   "  Tobacco Use    Smoking status: Never     Passive exposure: Never    Smokeless tobacco: Never   Vaping Use    Vaping status: Never Used   Substance and Sexual Activity    Alcohol use: No    Drug use: No    Sexual activity: Defer       Family History   Problem Relation Age of Onset    Lupus Mother     Hypertension Mother     Cancer Mother         HBP lupus    Stroke Mother     Heart failure Father     Heart disease Father         valve problems due to rheumatic fever    Hypertension Father     Obesity Father     Cancer Father         congestive heart failure    Cancer Brother         Bi Polar    Cancer Sister         HBP       Review of Systems   Constitutional: Negative for malaise/fatigue and night sweats.   Cardiovascular:  Negative for chest pain, claudication, dyspnea on exertion, irregular heartbeat, leg swelling, near-syncope, orthopnea, palpitations and syncope.   Respiratory:  Negative for shortness of breath and wheezing.    Musculoskeletal:  Positive for stiffness. Negative for back pain and joint pain.   Gastrointestinal:  Negative for anorexia, heartburn, nausea and vomiting.   Genitourinary:  Negative for dysuria, hematuria, hesitancy and nocturia.   Neurological:  Positive for dizziness and light-headedness.   Psychiatric/Behavioral:  Negative for depression and memory loss. The patient is not nervous/anxious.            Objective     /78 (BP Location: Left arm, Patient Position: Sitting)   Pulse (!) 48   Ht 180 cm (70.87\")   Wt 124 kg (273 lb 12.8 oz)   BMI 38.33 kg/m²     Constitutional:       Appearance: Not in distress.   Eyes:      Pupils: Pupils are equal, round, and reactive to light.   HENT:      Nose: Nose normal.   Pulmonary:      Effort: Pulmonary effort is normal.      Breath sounds: Normal breath sounds.   Cardiovascular:      PMI at left midclavicular line. Bradycardia present. Regular rhythm.      Murmurs: There is a systolic murmur.   Abdominal:      Palpations: Abdomen is " soft.   Musculoskeletal: Normal range of motion.      Cervical back: Normal range of motion and neck supple. Skin:     General: Skin is warm and dry.   Neurological:      Mental Status: Alert.           ECG 12 Lead    Date/Time: 7/18/2024 12:33 PM  Performed by: Mae Napier APRN    Authorized by: Mae Napier APRN  Comparison: compared with previous ECG from 1/17/2024  Rhythm: sinus bradycardia  BPM: 48  Conduction: incomplete right bundle branch block    Clinical impression: abnormal EKG  Comments: Normal QT               Diagnoses and all orders for this visit:    1. Bradycardia, sinus (Primary)  -     ECG 12 Lead    2. PAF (paroxysmal atrial fibrillation)    3. Primary hypertension    4. Hypercholesteremia    5. Severe obesity (BMI 35.0-39.9) with comorbidity    Other orders  -     lisinopril-hydrochlorothiazide (PRINZIDE,ZESTORETIC) 10-12.5 MG per tablet; Take 1 tablet by mouth Daily As Needed (for elevated BP).  Dispense: 90 tablet; Refill: 2  -     rivaroxaban (Xarelto) 20 MG tablet; Take 1 tablet by mouth Daily.  Dispense: 90 tablet; Refill: 2  -     sotalol (BETAPACE) 80 MG tablet; 1/2 to 1 tablet daily  Dispense: 90 tablet; Refill: 2             PAF: Continue sotalol for rhythm management. Will advise to lower to 1/2 tablet every other day as he has been taking only 1/2 tablet daily, may add extra 1/2 tablet daily for palpitations. Continue xarelto for anticoagulation. Bleed and bruise denied.  Samples of xarelto given,for 4 weeks.    HTN: BP is stable. Using zestoretic as needed. Limited sodium urged.    Hyperlipidemia: Using fish oil. FLP with you. Can we have for review? Limited carb diet urged.    Refills per request    BMI noted at 38.33, good cardiac diet with limited carbs, calories, and activity as tolerated advised.     6 month follow up urged, sooner if needed.        Problems Addressed this Visit          Cardiac and Vasculature    Hypercholesteremia    PAF (paroxysmal atrial  fibrillation)    Relevant Medications    sotalol (BETAPACE) 80 MG tablet     Other Visit Diagnoses       Bradycardia, sinus    -  Primary    Relevant Medications    sotalol (BETAPACE) 80 MG tablet    Other Relevant Orders    ECG 12 Lead    Primary hypertension        Relevant Medications    lisinopril-hydrochlorothiazide (PRINZIDE,ZESTORETIC) 10-12.5 MG per tablet    sotalol (BETAPACE) 80 MG tablet    Severe obesity (BMI 35.0-39.9) with comorbidity              Diagnoses         Codes Comments    Bradycardia, sinus    -  Primary ICD-10-CM: R00.1  ICD-9-CM: 427.89     PAF (paroxysmal atrial fibrillation)     ICD-10-CM: I48.0  ICD-9-CM: 427.31     Primary hypertension     ICD-10-CM: I10  ICD-9-CM: 401.9     Hypercholesteremia     ICD-10-CM: E78.00  ICD-9-CM: 272.0     Severe obesity (BMI 35.0-39.9) with comorbidity     ICD-10-CM: E66.01  ICD-9-CM: 278.01                       Electronically signed by Mae Napier, APRN July 18, 2024 16:58 EDT

## 2025-01-29 ENCOUNTER — OFFICE VISIT (OUTPATIENT)
Dept: CARDIOLOGY | Facility: CLINIC | Age: 60
End: 2025-01-29
Payer: COMMERCIAL

## 2025-01-29 VITALS
SYSTOLIC BLOOD PRESSURE: 133 MMHG | HEIGHT: 71 IN | BODY MASS INDEX: 35.42 KG/M2 | WEIGHT: 253 LBS | DIASTOLIC BLOOD PRESSURE: 88 MMHG | HEART RATE: 49 BPM

## 2025-01-29 DIAGNOSIS — R42 DIZZINESS: ICD-10-CM

## 2025-01-29 DIAGNOSIS — R29.6 FREQUENT FALLS: ICD-10-CM

## 2025-01-29 DIAGNOSIS — I10 PRIMARY HYPERTENSION: Primary | ICD-10-CM

## 2025-01-29 DIAGNOSIS — E66.01 SEVERE OBESITY (BMI 35.0-39.9) WITH COMORBIDITY: ICD-10-CM

## 2025-01-29 DIAGNOSIS — R00.1 BRADYCARDIA: ICD-10-CM

## 2025-01-29 DIAGNOSIS — E78.00 HYPERCHOLESTEREMIA: ICD-10-CM

## 2025-01-29 DIAGNOSIS — I48.0 PAF (PAROXYSMAL ATRIAL FIBRILLATION): ICD-10-CM

## 2025-01-29 RX ORDER — LISINOPRIL 5 MG/1
5 TABLET ORAL DAILY
Qty: 90 TABLET | Refills: 3 | Status: SHIPPED | OUTPATIENT
Start: 2025-01-29 | End: 2025-01-29

## 2025-01-29 RX ORDER — SOTALOL HYDROCHLORIDE 80 MG/1
TABLET ORAL
Qty: 45 TABLET | Refills: 2 | Status: SHIPPED | OUTPATIENT
Start: 2025-01-29

## 2025-01-29 RX ORDER — DULOXETIN HYDROCHLORIDE 20 MG/1
CAPSULE, DELAYED RELEASE ORAL
COMMUNITY
Start: 2025-01-23

## 2025-01-29 RX ORDER — LISINOPRIL AND HYDROCHLOROTHIAZIDE 10; 12.5 MG/1; MG/1
1 TABLET ORAL DAILY
Qty: 90 TABLET | Refills: 2 | Status: SHIPPED | OUTPATIENT
Start: 2025-01-29 | End: 2025-01-29

## 2025-01-29 RX ORDER — HYDROXYCHLOROQUINE SULFATE 200 MG/1
TABLET, FILM COATED ORAL
COMMUNITY
Start: 2024-11-05

## 2025-01-29 RX ORDER — LISINOPRIL 5 MG/1
5 TABLET ORAL 2 TIMES DAILY
Qty: 180 TABLET | Refills: 3 | Status: SHIPPED | OUTPATIENT
Start: 2025-01-29

## 2025-01-29 NOTE — PROGRESS NOTES
Chief Complaint   Patient presents with    Follow-up     Cardiac management - Patient states he's been doing okay but he's had three falls this month. Patient states he has had some palpitations and shortness of breath. Patient denies any chest pains or leg swelling.    Labs Only     Patient most recent labs are in chart. Patient is going next week to PCP to get his labs done.     Med Refill     Patient states he believes he may need some refills on his medications but he is unsure.        Cardiac Complaints  Dizziness      Subjective   [place] [facility] Elizabeth Jr. is a 60 y.o. male with HTN, hyperlipidemia, metablic syndrome, and PAF (diagnosed in 2022). At the time of diagnosis of PAF, he was started on sotalol and xarelto. Cardiac workup after diagnosis showed LV function normal with no sig valve concerns, overnight oximetry normal. Patient then underwent right thyroid lobectomy in July 2023 followed by left knee arthroscopy in November. He then called with pre-syncope in Jan 2024 and cardiac workup urged. Stress showed HTN response with apical infarct and ischemia vs infarct, home meds continued. Carotid US 1/31/2024 showed only mild plaque without occlusion.     Patient comes today for follow up and admits to three falls this month. He does have some palpitations and SOA. CP, swelling, and syncope noted. Labs with PCP, will be having checked next week. Refills requested.            Cardiac History  Past Surgical History:   Procedure Laterality Date    CARDIOVASCULAR STRESS TEST  05/04/2006    @[facility]. - 7 Min, 89% THR. EF 51%. negative    CARDIOVASCULAR STRESS TEST  03/17/2017    EF 57%, 4 min 16 sec, no ischemia    CARDIOVASCULAR STRESS TEST  02/15/2024    Lexiscan- EF 51%. 140/106.  Apical Infarct with Ischemia Vs Thinning    COLONOSCOPY      ECHO - CONVERTED  03/17/2017    EF 60-65%, no AS, mild MR    ECHO - CONVERTED  12/28/2022    EF 65%. LA- 4.3. Trace-Mild MR. RVSP- 39 mmHG. AO-4.0    ECHO - CONVERTED   02/15/2023    TLS. EF 55%. LA- 4.2. Trace-Mild MR. AO- 3.9. RVSP- 32 mmHg    GASTRIC SLEEVE LAPAROSCOPIC  2010    NEPHRECTOMY Left     REPLACEMENT TOTAL KNEE Left 11/27/2023    ROTATOR CUFF REPAIR Left     THYROIDECTOMY Right 08/03/2023    Procedure: THYROID LOBECTOMY RIGHT;  Surgeon: Carlos Uribe MD;  Location: [facility];  Service: General;  Laterality: Right;    US CAROTID UNILATERAL  01/30/2024    No sig stenosis bilaterally       Current Outpatient Medications   Medication Sig Dispense Refill    DULoxetine (CYMBALTA) 20 MG capsule       hydroxychloroquine (PLAQUENIL) 200 MG tablet       Omega-3 Fatty Acids (Fish Oil Burp-Less) 1200 MG capsule Take  by mouth 2 (Two) Times a Day.      rivaroxaban (Xarelto) 20 MG tablet Take 1 tablet by mouth Daily. 90 tablet 2    lisinopril (PRINIVIL,ZESTRIL) 5 MG tablet Take 1 tablet by mouth 2 (Two) Times a Day. 180 tablet 3    sotalol (Betapace) 80 MG tablet 1/2 tablet per day if needed 45 tablet 2     No current facility-administered medications for this visit.       Patient has no known allergies.    Past Medical History:   Diagnosis Date    Arthritis     Atrial fibrillation     Chronic back pain     Dyspnea on exertion     Elevated cholesterol     Fatigue     GERD (gastroesophageal reflux disease)     Glucose intolerance (impaired glucose tolerance)     Heartburn     Hepatitis     age 15, doesn't know what kind    Hypercholesterolemia     Hypertension     Hypertriglyceridemia     Insomnia     Kidney stone     with abcess    Murmur, cardiac 12/21/2022    Osteoarthritis of neck     /back    Pain in hip     Pain in neck     /back    Pain, joint, ankle and foot     Pain, knee     Peripheral edema     Testosterone deficiency 2017    Thyroid nodule 2020    Toxic multinodular goiter 03/30/2023       Social History     Socioeconomic History    Marital status:    Tobacco Use    Smoking status: Never     Passive exposure: Never    Smokeless tobacco: Never   Vaping Use  "   Vaping status: Never Used   Substance and Sexual Activity    Alcohol use: No    Drug use: No    Sexual activity: Defer       Family History   Problem Relation Age of Onset    Lupus Mother     Hypertension Mother     Cancer Mother         HBP lupus    Stroke Mother     Heart failure Father     Heart disease Father         valve problems due to rheumatic fever    Hypertension Father     Obesity Father     Cancer Father         congestive heart failure    Cancer Brother         Bi Polar    Cancer Sister         HBP       Review of Systems   Constitutional: Negative for malaise/fatigue and night sweats.   Cardiovascular:  Negative for chest pain, claudication, dyspnea on exertion, irregular heartbeat, leg swelling, near-syncope, orthopnea, palpitations and syncope.   Respiratory:  Negative for cough, shortness of breath and wheezing.    Musculoskeletal:  Positive for stiffness. Negative for back pain and joint pain.   Gastrointestinal:  Negative for anorexia, heartburn, nausea and vomiting.   Genitourinary:  Negative for dysuria, hematuria, hesitancy and nocturia.   Neurological:  Positive for dizziness and light-headedness.   Psychiatric/Behavioral:  Negative for depression and memory loss. The patient is not nervous/anxious.            Objective     /88 (BP Location: Right arm, Patient Position: Sitting, Cuff Size: Adult)   Pulse (!) 49   Ht 180 cm (70.87\")   Wt 115 kg (253 lb)   BMI 35.42 kg/m²     Constitutional:       Appearance: Not in distress.   Eyes:      Pupils: Pupils are equal, round, and reactive to light.   HENT:      Nose: Nose normal.   Pulmonary:      Effort: Pulmonary effort is normal.      Breath sounds: Normal breath sounds.   Cardiovascular:      PMI at left midclavicular line. Bradycardia present. Regular rhythm.      Murmurs: There is a systolic murmur.   Abdominal:      Palpations: Abdomen is soft.   Musculoskeletal: Normal range of motion.      Cervical back: Normal range of motion " and neck supple. Skin:     General: Skin is warm and dry.   Neurological:      Mental Status: Alert.           ECG 12 Lead    Date/Time: 1/30/2025 12:22 PM  Performed by: Mae Napier APRN    Authorized by: Mae Napier APRN  Comparison: compared with previous ECG from 7/18/2024  Similar to previous ECG  Rhythm: sinus bradycardia  BPM: 49  Conduction: right bundle branch block    Clinical impression: abnormal EKG  Comments: Normal QT               Diagnoses and all orders for this visit:    1. Primary hypertension (Primary)    2. Bradycardia  -     Holter Monitor - 72 Hour Up To 15 Days; Future  -     ECG 12 Lead    3. PAF (paroxysmal atrial fibrillation)  -     Holter Monitor - 72 Hour Up To 15 Days; Future    4. Dizziness    5. Frequent falls    6. Hypercholesteremia    7. Severe obesity (BMI 35.0-39.9) with comorbidity    Other orders  -     Discontinue: lisinopril-hydrochlorothiazide (PRINZIDE,ZESTORETIC) 10-12.5 MG per tablet; Take 1 tablet by mouth Daily.  Dispense: 90 tablet; Refill: 2  -     Discontinue: lisinopril (PRINIVIL,ZESTRIL) 5 MG tablet; Take 1 tablet by mouth Daily.  Dispense: 90 tablet; Refill: 3  -     lisinopril (PRINIVIL,ZESTRIL) 5 MG tablet; Take 1 tablet by mouth 2 (Two) Times a Day.  Dispense: 180 tablet; Refill: 3  -     rivaroxaban (Xarelto) 20 MG tablet; Take 1 tablet by mouth Daily.  Dispense: 90 tablet; Refill: 2  -     sotalol (Betapace) 80 MG tablet; 1/2 tablet per day if needed  Dispense: 45 tablet; Refill: 2               PAF: HR is low in the 40's. Multiple falls noted. Will continue with sotalol at 1/2 tablet per day, but recommend  7 day holter. If pausing should be seen, we will advise to hold sotalol. Continue xarelto for anticoagulation. Bleed and bruise denied. EKG done today showed sinus carlos with RBBB.     HTN: BP was initially elevated. Repeat still elevated, but improved after second check. Will continue with lisinopril at 5mg daily, use extra 5mg if needed for  elevation. Limited sodium urged.     Hyperlipidemia: Using fish oil. FLP with you. Can we have for review? Limited carb diet urged.     Refills per request     BMI noted at 35.42, good cardiac diet with limited carbs, calories, and activity as tolerated advised. Praised for weight loss efforts!     6 month follow up urged, sooner if needed.            Problems Addressed this Visit          Cardiac and Vasculature    Hypercholesteremia    Bradycardia    Relevant Orders    Holter Monitor - 72 Hour Up To 15 Days    ECG 12 Lead (Completed)    PAF (paroxysmal atrial fibrillation)    Relevant Medications    sotalol (Betapace) 80 MG tablet    Other Relevant Orders    Holter Monitor - 72 Hour Up To 15 Days     Other Visit Diagnoses       Primary hypertension    -  Primary    Relevant Medications    lisinopril (PRINIVIL,ZESTRIL) 5 MG tablet    sotalol (Betapace) 80 MG tablet    Dizziness        Frequent falls        Severe obesity (BMI 35.0-39.9) with comorbidity              Diagnoses         Codes Comments    Primary hypertension    -  Primary ICD-10-CM: I10  ICD-9-CM: 401.9     Bradycardia     ICD-10-CM: R00.1  ICD-9-CM: 427.89     PAF (paroxysmal atrial fibrillation)     ICD-10-CM: I48.0  ICD-9-CM: 427.31     Dizziness     ICD-10-CM: R42  ICD-9-CM: 780.4     Frequent falls     ICD-10-CM: R29.6  ICD-9-CM: V15.88     Hypercholesteremia     ICD-10-CM: E78.00  ICD-9-CM: 272.0     Severe obesity (BMI 35.0-39.9) with comorbidity     ICD-10-CM: E66.01  ICD-9-CM: 278.01                     Electronically signed by KIMO Trinidad January 30, 2025 12:23 EST

## 2025-04-02 ENCOUNTER — TELEPHONE (OUTPATIENT)
Dept: CARDIOLOGY | Facility: CLINIC | Age: 60
End: 2025-04-02
Payer: COMMERCIAL

## 2025-04-02 NOTE — TELEPHONE ENCOUNTER
Received fax from Dr. Ricketts for cardiac clearance for patient to have a bilateral L5, S1 medial branch block. Patient is on Xarelto and they are requesting to hold for 7 to 10 days prior to procedure. According to our records, I do not see where patient has had any stenting. Patient has a history of afib.          Fax [phone]

## 2025-04-02 NOTE — TELEPHONE ENCOUNTER
Okay for clearance  If they want to hold for 10 days then the risk of stroke will be moderately high  No studies have been shown that holding for more than 3 days have any beneficial effect  Patient should be notified of the high risk of stroke

## 2025-04-03 NOTE — TELEPHONE ENCOUNTER
Patient was made aware that if they hold Xarelto for 7 to 10 days prior to procedure then he will be high risk for stroke. Typically Xarelto is only held for 3 days.

## 2025-04-22 RX ORDER — SOTALOL HYDROCHLORIDE 80 MG/1
TABLET ORAL
Qty: 90 TABLET | Refills: 3 | Status: SHIPPED | OUTPATIENT
Start: 2025-04-22

## 2025-05-06 RX ORDER — RIVAROXABAN 20 MG/1
20 TABLET, FILM COATED ORAL DAILY
Qty: 90 TABLET | Refills: 3 | Status: SHIPPED | OUTPATIENT
Start: 2025-05-06

## 2025-06-24 ENCOUNTER — TELEPHONE (OUTPATIENT)
Dept: CARDIOLOGY | Facility: CLINIC | Age: 60
End: 2025-06-24
Payer: COMMERCIAL

## 2025-08-06 ENCOUNTER — OFFICE VISIT (OUTPATIENT)
Dept: CARDIOLOGY | Facility: CLINIC | Age: 60
End: 2025-08-06
Payer: COMMERCIAL

## 2025-08-06 VITALS
BODY MASS INDEX: 36.96 KG/M2 | HEART RATE: 68 BPM | WEIGHT: 264 LBS | DIASTOLIC BLOOD PRESSURE: 90 MMHG | HEIGHT: 71 IN | SYSTOLIC BLOOD PRESSURE: 148 MMHG

## 2025-08-06 DIAGNOSIS — G89.29 CHRONIC RIGHT SHOULDER PAIN: ICD-10-CM

## 2025-08-06 DIAGNOSIS — E66.01 SEVERE OBESITY (BMI 35.0-39.9) WITH COMORBIDITY: ICD-10-CM

## 2025-08-06 DIAGNOSIS — R00.2 PALPITATIONS: ICD-10-CM

## 2025-08-06 DIAGNOSIS — R00.1 BRADYCARDIA: ICD-10-CM

## 2025-08-06 DIAGNOSIS — E78.00 HYPERCHOLESTEREMIA: ICD-10-CM

## 2025-08-06 DIAGNOSIS — M25.511 CHRONIC RIGHT SHOULDER PAIN: ICD-10-CM

## 2025-08-06 DIAGNOSIS — I48.0 PAF (PAROXYSMAL ATRIAL FIBRILLATION): Primary | ICD-10-CM

## 2025-08-06 DIAGNOSIS — I10 PRIMARY HYPERTENSION: ICD-10-CM

## 2025-08-06 RX ORDER — HYDROCODONE BITARTRATE AND ACETAMINOPHEN 5; 325 MG/1; MG/1
1 TABLET ORAL 2 TIMES DAILY
COMMUNITY

## 2025-08-06 RX ORDER — LISINOPRIL 5 MG/1
5 TABLET ORAL DAILY
Qty: 90 TABLET | Refills: 2 | Status: SHIPPED | OUTPATIENT
Start: 2025-08-06

## 2025-08-06 RX ORDER — LISINOPRIL 5 MG/1
5 TABLET ORAL EVERY OTHER DAY
COMMUNITY
End: 2025-08-06

## (undated) DEVICE — DISH PETRI 3.5IN MD STRL LF

## (undated) DEVICE — PATIENT RETURN ELECTRODE, SINGLE-USE, CONTACT QUALITY MONITORING, ADULT, WITH 9FT CORD, FOR PATIENTS WEIGING OVER 33LBS. (15KG): Brand: MEGADYNE

## (undated) DEVICE — SUT SILK 2/0 TIES 18IN A185H

## (undated) DEVICE — BLANKT WARM LOWR/BDY 100X120CM

## (undated) DEVICE — HDRST PAD EA/20PC

## (undated) DEVICE — GLV SURG BIOGEL LTX PF 7

## (undated) DEVICE — TBG PENCL TELESCP MEGADYNE SMOKE EVAC 10FT

## (undated) DEVICE — Device

## (undated) DEVICE — SUT SILK 3/0 TIES 18IN A184H

## (undated) DEVICE — ELECTRODE 8227410 PAIRED 2 CH SET ROHS

## (undated) DEVICE — DRSNG SURESITE WNDW 4X4.5

## (undated) DEVICE — TRAP FLD MINIVAC MEGADYNE 100ML

## (undated) DEVICE — APPL CHLORAPREP 26ML CLR

## (undated) DEVICE — UNDERGLV SURG BIOGEL INDICATOR LF PF 7.5

## (undated) DEVICE — ANTIBACTERIAL UNDYED BRAIDED (POLYGLACTIN 910), SYNTHETIC ABSORBABLE SUTURE: Brand: COATED VICRYL

## (undated) DEVICE — HARMONIC FOCUS SHEARS 9CM LENGTH + ADAPTIVE TISSUE TECHNOLOGY FOR USE WITH BLUE HAND PIECE ONLY: Brand: HARMONIC FOCUS

## (undated) DEVICE — GAUZE,SPONGE,4"X4",16PLY,XRAY,STRL,LF: Brand: MEDLINE

## (undated) DEVICE — PCH INST SURG INVISISHIELD 2PCKT

## (undated) DEVICE — INTENDED USE FOR SURGICAL MARKING ON INTACT SKIN, ALSO PROVIDES A PERMANENT METHOD OF IDENTIFYING OBJECTS IN THE OPERATING ROOM: Brand: WRITESITE® REGULAR TIP SKIN MARKER

## (undated) DEVICE — ELECTRD BLD EZ CLN MOD XLNG 2.75IN

## (undated) DEVICE — PK MINOR SPLT 10

## (undated) DEVICE — SUT MNCRYL PLS ANTIB UD 4/0 PS2 18IN

## (undated) DEVICE — DISPOSABLE BIPOLAR FORCEPS 4" (10.2CM) JEWELERS, STRAIGHT 0.4MM TIP AND 12 FT. (3.6M) CABLE: Brand: KIRWAN

## (undated) DEVICE — PROBE 8225825 3PK INCREMT STD PRASS ROHS

## (undated) DEVICE — MARKER,SKIN,W/RULER,DUAL,STOP: Brand: MEDLINE